# Patient Record
Sex: FEMALE | Race: BLACK OR AFRICAN AMERICAN | NOT HISPANIC OR LATINO | Employment: FULL TIME | URBAN - METROPOLITAN AREA
[De-identification: names, ages, dates, MRNs, and addresses within clinical notes are randomized per-mention and may not be internally consistent; named-entity substitution may affect disease eponyms.]

---

## 2017-10-31 ENCOUNTER — INITIAL PRENATAL (OUTPATIENT)
Dept: OBSTETRICS AND GYNECOLOGY | Facility: CLINIC | Age: 32
End: 2017-10-31

## 2017-10-31 VITALS
HEIGHT: 61 IN | DIASTOLIC BLOOD PRESSURE: 70 MMHG | SYSTOLIC BLOOD PRESSURE: 101 MMHG | WEIGHT: 152 LBS | BODY MASS INDEX: 28.7 KG/M2

## 2017-10-31 DIAGNOSIS — N92.6 MISSED MENSES: Primary | ICD-10-CM

## 2017-10-31 DIAGNOSIS — Z11.4 SCREENING FOR HIV (HUMAN IMMUNODEFICIENCY VIRUS): ICD-10-CM

## 2017-10-31 DIAGNOSIS — Z34.80 NORMAL PREGNANCY IN MULTIGRAVIDA: ICD-10-CM

## 2017-10-31 DIAGNOSIS — D75.839 THROMBOCYTOSIS: ICD-10-CM

## 2017-10-31 DIAGNOSIS — Z64.1 GRAND MULTIPARA: ICD-10-CM

## 2017-10-31 DIAGNOSIS — Z02.83 ENCOUNTER FOR DRUG SCREENING: ICD-10-CM

## 2017-10-31 DIAGNOSIS — O99.011 ANEMIA COMPLICATING PREGNANCY IN FIRST TRIMESTER: ICD-10-CM

## 2017-10-31 DIAGNOSIS — Z11.3 SCREEN FOR STD (SEXUALLY TRANSMITTED DISEASE): ICD-10-CM

## 2017-10-31 DIAGNOSIS — Z12.4 SCREENING FOR CERVICAL CANCER: ICD-10-CM

## 2017-10-31 DIAGNOSIS — O21.9 NAUSEA/VOMITING IN PREGNANCY: ICD-10-CM

## 2017-10-31 LAB
B-HCG UR QL: POSITIVE
INTERNAL NEGATIVE CONTROL: NEGATIVE
INTERNAL POSITIVE CONTROL: POSITIVE
Lab: ABNORMAL

## 2017-10-31 PROCEDURE — 81025 URINE PREGNANCY TEST: CPT | Performed by: OBSTETRICS & GYNECOLOGY

## 2017-10-31 PROCEDURE — 99203 OFFICE O/P NEW LOW 30 MIN: CPT | Performed by: OBSTETRICS & GYNECOLOGY

## 2017-10-31 RX ORDER — CALCIUM CITRATE, IRON PENTACARBONYL, CHOLECALCIFEROL, .ALPHA.-TOCOPHEROL, DL-, PYRIDOXINE HYDROCHLORIDE, FOLIC ACID, DOCUSATE SODIUM, AND DOCONEXENT 104; 27; 400; 30; 25; 1; 50; 260 MG/1; MG/1; [IU]/1; [IU]/1; MG/1; MG/1; MG/1; MG/1
1 CAPSULE, GELATIN COATED ORAL DAILY
Qty: 30 CAPSULE | Refills: 11 | Status: SHIPPED | OUTPATIENT
Start: 2017-10-31 | End: 2018-03-06

## 2017-10-31 RX ORDER — DIPHENHYDRAMINE HYDROCHLORIDE 25 MG/1
25 CAPSULE ORAL 3 TIMES DAILY
Qty: 90 TABLET | Refills: 2 | Status: SHIPPED | OUTPATIENT
Start: 2017-10-31 | End: 2017-12-12

## 2017-10-31 NOTE — PROGRESS NOTES
Chief complaint: Pregnancy, nausea and vomiting  History of present illness: Patient is here for her initial prenatal visit today.  Her only complaint is with nausea and vomiting every day.  She has not taken any medicine at this time.  She reports that she has had nausea for about 2 weeks, but it has worsened over the last 3 days.  She is not taking a prenatal vitamin.  She denies pelvic pain or abdominal pain.  She denies vaginal bleeding or discharge.  She has not been seen anywhere for this pregnancy.  She had a positive pregnancy test about 2-3 weeks ago.  Last menstrual period was 17 and within normal.  The patient reports that she would like to have permanent sterilization at the completion of this pregnancy.  Patient does report a history of a blood transfusion during her last pregnancy.  She reports that she saw hematology and no identifying course of her anemia was noted.  Her workup for sickle cell trait and thalassemia was negative, per the patient.    OB History    Para Term  AB Living   9 4 2 2 4 4   SAB TAB Ectopic Multiple Live Births   4          # Outcome Date GA Lbr Jose/2nd Weight Sex Delivery Anes PTL Lv   9 Current            8 Term  40w1d  8 lb 11 oz (3.941 kg) F Vag-Spont      7 Term  39w0d  7 lb 7 oz (3.374 kg) M Vag-Spont      6 SAB            5 SAB            4 SAB            3 SAB            2   35w0d  5 lb 9 oz (2.523 kg) F Vag-Spont      1   36w0d  7 lb 15 oz (3.6 kg) F Vag-Spont             Past Medical History:   Diagnosis Date   • Anemia    • Chlamydia    • History of blood product transfusion     with last pregnancy - saw hematology; w/u negative   • Tuberculosis     Previously exposed, but TB test negative   • Urinary tract infection      Past Surgical History:   Procedure Laterality Date   • HAND SURGERY       Social History   Substance Use Topics   • Smoking status: Former Smoker   • Smokeless tobacco: Never Used   • Alcohol use No  "    Family History   Problem Relation Age of Onset   • Colon cancer Mother    • Cervical cancer Mother    • Hypertension Paternal Grandmother    • Colon cancer Maternal Grandmother      Meds: None    Allergies   Allergen Reactions   • Penicillins      ROS:   General: No fever or chills  Constitutional: No weight loss or gain, no hair loss  HENT: No headache, no hearing loss, no tinnitus  Eyes: normal vision, no eye pain  Lungs: No cough, no shortness of breath  Heart: No chest pain, no palpitations  Abdomen: Pos nausea, vomiting, No constipation or diarrhea  : No dysuria, no hematuria  Skin: No rashes  Lymph: No swelling  Neuro: No parathesia, no weakness  Psych: Normal though content, no hallucinations, no SI/HI    PE:  Vitals:    10/31/17 1133 10/31/17 1134   BP: 101/70    Weight: 152 lb (68.9 kg)    Height:  61\" (154.9 cm)   See prenatal physical in flowsheet      Assessment:  1.  32-year-old  9 para 2-44 at 10-2/7 weeks gestational age by last motion.  2.  Nausea and vomiting of pregnancy  3.  Grand multiparity  4.  History of anemia and blood transfusion  5.  History of late  birth, followed by a term birth ×2  6.  History of recurrent miscarriage  Plan:  1.  Initial prenatal counseling performed the patient today.  Pap smear and gonorrhea and chlamydia testing today.  Routine prenatal blood work today.  We discussed the hospital cross coverage system as well as routine prenatal counseling.  2.  We discussed issues with the patient's previous  births.  She had a 35 and 36 week  birth.  36 week  birth is somewhat spurious, given that this baby weighed 7 lbs. 15 oz. even so, she has had 2 full-term deliveries since that time.  We discussed potential recommendations for treatment with weekly progesterone injections.  The risks, benefits, and alternatives were discussed.  Patient declines weekly progesterone injections, and I feel this is reasonable given her history.  3.  " Regarding nausea and vomiting, we discussed dietary and last modifications.  We will start vitamin B6 and doxylamine.  4.  Start a daily prenatal vitamin.  5.  We discussed patient's desires permanent contraception.  Risks, benefits, and alternatives were discussed, including the risk of regret and sterilization failure.  Patient still wishes to proceed with permanent sterilization.  We will continue to address this throughout her pregnancy, but I believe that sterilization should be reasonable for this patient.  Ultrasound next well for dating.  Return to the office in 4 weeks for OB follow-up.    I spent 20 out of 30 minutes with the patient in face to face counseling of the above issues.

## 2017-11-01 ENCOUNTER — PROCEDURE VISIT (OUTPATIENT)
Dept: OBSTETRICS AND GYNECOLOGY | Facility: CLINIC | Age: 32
End: 2017-11-01

## 2017-11-01 DIAGNOSIS — Z34.91 PREGNANCY WITH UNCERTAIN DATES IN FIRST TRIMESTER: Primary | ICD-10-CM

## 2017-11-01 PROBLEM — O99.011 ANEMIA COMPLICATING PREGNANCY IN FIRST TRIMESTER: Status: ACTIVE | Noted: 2017-11-01

## 2017-11-01 PROBLEM — D75.839 THROMBOCYTOSIS: Status: ACTIVE | Noted: 2017-11-01

## 2017-11-01 LAB
ABO GROUP BLD: (no result)
BASOPHILS # BLD AUTO: 0 X10E3/UL (ref 0–0.2)
BASOPHILS NFR BLD AUTO: 0 %
BLD GP AB SCN SERPL QL: NEGATIVE
EOSINOPHIL # BLD AUTO: 0 X10E3/UL (ref 0–0.4)
EOSINOPHIL NFR BLD AUTO: 1 %
ERYTHROCYTE [DISTWIDTH] IN BLOOD BY AUTOMATED COUNT: 25.6 % (ref 12.3–15.4)
HBV SURFACE AG SERPL QL IA: NEGATIVE
HCT VFR BLD AUTO: 27.6 % (ref 34–46.6)
HCV AB S/CO SERPL IA: <0.1 S/CO RATIO (ref 0–0.9)
HGB BLD-MCNC: 8.6 G/DL (ref 11.1–15.9)
HIV 1+2 AB+HIV1 P24 AG SERPL QL IA: NON REACTIVE
IMM GRANULOCYTES # BLD: 0 X10E3/UL (ref 0–0.1)
IMM GRANULOCYTES NFR BLD: 0 %
LYMPHOCYTES # BLD AUTO: 1.1 X10E3/UL (ref 0.7–3.1)
LYMPHOCYTES NFR BLD AUTO: 18 %
MCH RBC QN AUTO: 22 PG (ref 26.6–33)
MCHC RBC AUTO-ENTMCNC: 31.2 G/DL (ref 31.5–35.7)
MCV RBC AUTO: 71 FL (ref 79–97)
MONOCYTES # BLD AUTO: 0.3 X10E3/UL (ref 0.1–0.9)
MONOCYTES NFR BLD AUTO: 5 %
MORPHOLOGY BLD-IMP: (no result)
NEUTROPHILS # BLD AUTO: 4.8 X10E3/UL (ref 1.4–7)
NEUTROPHILS NFR BLD AUTO: 76 %
PLATELET # BLD AUTO: 533 X10E3/UL (ref 150–379)
RBC # BLD AUTO: 3.91 X10E6/UL (ref 3.77–5.28)
RH BLD: POSITIVE
RPR SER QL: NON REACTIVE
RUBV IGG SERPL IA-ACNC: 6.1 INDEX
WBC # BLD AUTO: 6.3 X10E3/UL (ref 3.4–10.8)

## 2017-11-01 PROCEDURE — 76817 TRANSVAGINAL US OBSTETRIC: CPT | Performed by: OBSTETRICS & GYNECOLOGY

## 2017-11-01 RX ORDER — IRON, FOLIC ACID, CYANOCOBALAMIN, ASCORBIC ACID, AND DOCUSATE SODIUM 90; 1; 12; 120; 50 MG/1; MG/1; UG/1; MG/1; MG/1
1 TABLET, FILM COATED ORAL DAILY
Qty: 30 EACH | Refills: 11 | Status: SHIPPED | OUTPATIENT
Start: 2017-11-01 | End: 2017-12-12

## 2017-11-02 ENCOUNTER — TELEPHONE (OUTPATIENT)
Dept: OBSTETRICS AND GYNECOLOGY | Facility: CLINIC | Age: 32
End: 2017-11-02

## 2017-11-02 DIAGNOSIS — O21.9 NAUSEA/VOMITING IN PREGNANCY: Primary | ICD-10-CM

## 2017-11-02 LAB
AMPHETAMINES SERPL QL SCN: NEGATIVE NG/ML
BACTERIA UR CULT: NORMAL
BACTERIA UR CULT: NORMAL
BARBITURATES SERPL QL SCN: NEGATIVE UG/ML
BENZODIAZ SERPL QL SCN: NEGATIVE NG/ML
C TRACH RRNA SPEC QL NAA+PROBE: NEGATIVE
CANNABINOIDS SERPL QL SCN: NEGATIVE NG/ML
COCAINE+BZE SERPL QL SCN: NEGATIVE NG/ML
CYTOLOGIST CVX/VAG CYTO: NORMAL
CYTOLOGY CVX/VAG DOC THIN PREP: NORMAL
DX ICD CODE: NORMAL
DX ICD CODE: NORMAL
HIV 1 & 2 AB SER-IMP: NORMAL
HPV I/H RISK 4 DNA CVX QL PROBE+SIG AMP: NEGATIVE
METHADONE SERPL QL SCN: NEGATIVE NG/ML
N GONORRHOEA RRNA SPEC QL NAA+PROBE: NEGATIVE
OPIATES SERPL QL SCN: NEGATIVE NG/ML
OTHER STN SPEC: NORMAL
OXYCODONE+OXYMORPHONE SERPLBLD QL SCN: NEGATIVE NG/ML
PATH REPORT.FINAL DX SPEC: NORMAL
PCP SERPL QL SCN: NEGATIVE NG/ML
PROPOXYPH SERPL QL SCN: NEGATIVE NG/ML
STAT OF ADQ CVX/VAG CYTO-IMP: NORMAL
T VAGINALIS RRNA SPEC QL NAA+PROBE: NEGATIVE

## 2017-11-02 RX ORDER — PROMETHAZINE HYDROCHLORIDE 25 MG/1
25 TABLET ORAL EVERY 6 HOURS PRN
Qty: 30 TABLET | Refills: 2 | Status: SHIPPED | OUTPATIENT
Start: 2017-11-02 | End: 2018-01-03

## 2017-11-02 NOTE — TELEPHONE ENCOUNTER
----- Message from Lisa Mcghee sent at 11/1/2017  4:35 PM EDT -----  Contact: Patient   Patient called stating the Unisom you prescribed is not helping with her nausea. She stated that she cannot eat or drink. She was wondering if you could send something else in for her.    Call back # [phone]

## 2017-11-02 NOTE — TELEPHONE ENCOUNTER
She needs to also be taking the vitamin B6 3 times a day to help with the nausea.  If those 2 things are not working, I will send in a prescription for promethazine, which she may also take for nausea and vomiting.  It may cause drowsiness.

## 2017-11-06 ENCOUNTER — TELEPHONE (OUTPATIENT)
Dept: OBSTETRICS AND GYNECOLOGY | Facility: CLINIC | Age: 32
End: 2017-11-06

## 2017-11-06 NOTE — TELEPHONE ENCOUNTER
----- Message from Torito Sanford MD sent at 11/1/2017  1:17 PM EDT -----  Notify the patient that her blood count shows that she is pretty anemic.  Given her history of needing a blood transfusion last pregnancy, I am somewhat concerned by this.  She is not stage IV she needs a blood transfusion now.  I would like to start her on iron pills, so I will send these into the pharmacy for her.  Her platelet count was also somewhat elevated.  I think it will be a good idea for her to see a hematologist again this pregnancy for evaluation of these issues. We will refer her to hematology

## 2017-11-08 ENCOUNTER — TELEPHONE (OUTPATIENT)
Dept: OBSTETRICS AND GYNECOLOGY | Facility: CLINIC | Age: 32
End: 2017-11-08

## 2017-11-22 ENCOUNTER — TELEPHONE (OUTPATIENT)
Dept: OBSTETRICS AND GYNECOLOGY | Facility: CLINIC | Age: 32
End: 2017-11-22

## 2017-11-22 ENCOUNTER — APPOINTMENT (OUTPATIENT)
Dept: ONCOLOGY | Facility: CLINIC | Age: 32
End: 2017-11-22

## 2017-11-22 ENCOUNTER — APPOINTMENT (OUTPATIENT)
Dept: OTHER | Facility: HOSPITAL | Age: 32
End: 2017-11-22

## 2017-11-22 NOTE — TELEPHONE ENCOUNTER
PT called to cancel her appt with the hematology office. She stated that she was never informed that her appt was with the hematology office and that she thought that her appt was in our office. She will be calling their office to cancel and possibly reschedule.

## 2017-11-28 ENCOUNTER — ROUTINE PRENATAL (OUTPATIENT)
Dept: OBSTETRICS AND GYNECOLOGY | Facility: CLINIC | Age: 32
End: 2017-11-28

## 2017-11-28 VITALS — SYSTOLIC BLOOD PRESSURE: 113 MMHG | BODY MASS INDEX: 31.37 KG/M2 | DIASTOLIC BLOOD PRESSURE: 73 MMHG | WEIGHT: 166 LBS

## 2017-11-28 DIAGNOSIS — O99.011 ANEMIA COMPLICATING PREGNANCY IN FIRST TRIMESTER: Primary | ICD-10-CM

## 2017-11-28 DIAGNOSIS — Z34.80 NORMAL PREGNANCY IN MULTIGRAVIDA: ICD-10-CM

## 2017-11-28 DIAGNOSIS — Z64.1 GRAND MULTIPARA: ICD-10-CM

## 2017-11-28 PROBLEM — Z02.83 ENCOUNTER FOR DRUG SCREENING: Status: RESOLVED | Noted: 2017-10-31 | Resolved: 2017-11-28

## 2017-11-28 PROBLEM — Z11.3 SCREEN FOR STD (SEXUALLY TRANSMITTED DISEASE): Status: RESOLVED | Noted: 2017-10-31 | Resolved: 2017-11-28

## 2017-11-28 PROBLEM — Z12.4 SCREENING FOR CERVICAL CANCER: Status: RESOLVED | Noted: 2017-10-31 | Resolved: 2017-11-28

## 2017-11-28 PROBLEM — Z11.4 SCREENING FOR HIV (HUMAN IMMUNODEFICIENCY VIRUS): Status: RESOLVED | Noted: 2017-10-31 | Resolved: 2017-11-28

## 2017-11-28 PROCEDURE — 99213 OFFICE O/P EST LOW 20 MIN: CPT | Performed by: OBSTETRICS & GYNECOLOGY

## 2017-11-28 NOTE — PROGRESS NOTES
Chief complaint: Pregnancy, nausea and vomiting, improving  History present illness: Patient is here for her routine prenatal visit.  She has no major complaints today.  She does notice some improvement in her nausea and vomiting.  She has been trying to taper down some on the vitamin B6 and the promethazine.  She is still taking the mostly every day.  She has not started her iron supplement, because she was having difficulty with the insurance company.  She reports that she feels this will be corrected by the beginning of next month.  She is taking her prenatal vitamin.  Otherwise she is doing well today.  Denies vaginal bleeding or pelvic pain.  Objective: See vital signs in flowsheet  Gen.: No acute distress, awake and oriented ×3  Abdomen: Soft, nontender, fetal heart tones 165  Extremities: No edema, no tenderness  Labs: Prenatal labs reviewed with the patient.  Flowsheet updated.  Ultrasound: Ultrasound findings reviewed.  Estimated due date by ultrasound is 18.  This was not consistent with dates.  Revised due date noted.  Assessment:  1.  32-year-old  9 para 2244 at 13-0/7 weeks gestational age  2.  Nausea and vomiting of pregnancy, improving  3.  Severe anemia of pregnancy, patient with history of blood transfusion in her last pregnancy  4.  Grand multiparity  5.  History of late  birth, followed by term birth ×2  Plan:  1.  Ultrasound and lab findings discussed with the patient at length.  Discussed the need for iron supplementation.  The patient believes that she will be able to get her prescription filled next month.  In the meantime, I have sent her home with 15 day supply of samples of Ferralet.  We discussed the importance of taking iron supplementation.  She is also scheduled to see a hematologist on 17.  She is aware of this appointment.  2.  Patient advised to try to begin decreasing her antinausea medications.  She verbalizes understanding.  3.  Return to the office in 5  weeks for OB follow-up.  Plan ultrasound for anatomy in 5 weeks.  I spent 12 out of 15 minutes with the patient in face to face counseling of the above issues.

## 2017-12-12 ENCOUNTER — LAB (OUTPATIENT)
Dept: LAB | Facility: HOSPITAL | Age: 32
End: 2017-12-12

## 2017-12-12 ENCOUNTER — CONSULT (OUTPATIENT)
Dept: ONCOLOGY | Facility: CLINIC | Age: 32
End: 2017-12-12

## 2017-12-12 VITALS
SYSTOLIC BLOOD PRESSURE: 100 MMHG | WEIGHT: 163.8 LBS | DIASTOLIC BLOOD PRESSURE: 68 MMHG | HEART RATE: 97 BPM | BODY MASS INDEX: 29.02 KG/M2 | RESPIRATION RATE: 16 BRPM | TEMPERATURE: 98.2 F | HEIGHT: 63 IN | OXYGEN SATURATION: 100 %

## 2017-12-12 DIAGNOSIS — D75.839 THROMBOCYTOSIS: Primary | ICD-10-CM

## 2017-12-12 DIAGNOSIS — O99.011 ANEMIA COMPLICATING PREGNANCY IN FIRST TRIMESTER: Primary | ICD-10-CM

## 2017-12-12 LAB
BASOPHILS # BLD AUTO: 0.04 10*3/MM3 (ref 0–0.1)
BASOPHILS NFR BLD AUTO: 0.7 % (ref 0–1.1)
DEPRECATED RDW RBC AUTO: 54 FL (ref 37–49)
EOSINOPHIL # BLD AUTO: 0.09 10*3/MM3 (ref 0–0.36)
EOSINOPHIL NFR BLD AUTO: 1.5 % (ref 1–5)
ERYTHROCYTE [DISTWIDTH] IN BLOOD BY AUTOMATED COUNT: 20.8 % (ref 11.7–14.5)
FERRITIN SERPL-MCNC: 8.6 NG/ML (ref 11–207)
HCT VFR BLD AUTO: 25.4 % (ref 34–45)
HGB BLD-MCNC: 7.7 G/DL (ref 11.5–14.9)
HGB RETIC QN: 21.2 PG (ref 29.8–36.1)
IMM GRANULOCYTES # BLD: 0.08 10*3/MM3 (ref 0–0.03)
IMM GRANULOCYTES NFR BLD: 1.4 % (ref 0–0.5)
IMM RETICS NFR: 28.9 % (ref 3–15.8)
IRON 24H UR-MRATE: 24 MCG/DL (ref 37–145)
IRON SATN MFR SERPL: 4 % (ref 14–48)
LYMPHOCYTES # BLD AUTO: 1.15 10*3/MM3 (ref 1–3.5)
LYMPHOCYTES NFR BLD AUTO: 19.6 % (ref 20–49)
MCH RBC QN AUTO: 22.6 PG (ref 27–33)
MCHC RBC AUTO-ENTMCNC: 30.3 G/DL (ref 32–35)
MCV RBC AUTO: 74.7 FL (ref 83–97)
MONOCYTES # BLD AUTO: 0.6 10*3/MM3 (ref 0.25–0.8)
MONOCYTES NFR BLD AUTO: 10.2 % (ref 4–12)
NEUTROPHILS # BLD AUTO: 3.9 10*3/MM3 (ref 1.5–7)
NEUTROPHILS NFR BLD AUTO: 66.6 % (ref 39–75)
NRBC BLD MANUAL-RTO: 0 /100 WBC (ref 0–0)
PLATELET # BLD AUTO: 250 10*3/MM3 (ref 150–375)
RBC # BLD AUTO: 3.4 10*6/MM3 (ref 3.9–5)
RETICS/RBC NFR AUTO: 2.36 % (ref 0.6–2)
TIBC SERPL-MCNC: 535 MCG/DL (ref 249–505)
TRANSFERRIN SERPL-MCNC: 382 MG/DL (ref 200–360)
VIT B12 BLD-MCNC: 470 PG/ML (ref 211–946)
WBC NRBC COR # BLD: 5.86 10*3/MM3 (ref 4–10)

## 2017-12-12 PROCEDURE — 85046 RETICYTE/HGB CONCENTRATE: CPT | Performed by: INTERNAL MEDICINE

## 2017-12-12 PROCEDURE — 84466 ASSAY OF TRANSFERRIN: CPT | Performed by: INTERNAL MEDICINE

## 2017-12-12 PROCEDURE — 36415 COLL VENOUS BLD VENIPUNCTURE: CPT | Performed by: INTERNAL MEDICINE

## 2017-12-12 PROCEDURE — 82728 ASSAY OF FERRITIN: CPT | Performed by: INTERNAL MEDICINE

## 2017-12-12 PROCEDURE — 36416 COLLJ CAPILLARY BLOOD SPEC: CPT | Performed by: INTERNAL MEDICINE

## 2017-12-12 PROCEDURE — 99244 OFF/OP CNSLTJ NEW/EST MOD 40: CPT | Performed by: INTERNAL MEDICINE

## 2017-12-12 PROCEDURE — 82607 VITAMIN B-12: CPT | Performed by: INTERNAL MEDICINE

## 2017-12-12 PROCEDURE — 85025 COMPLETE CBC W/AUTO DIFF WBC: CPT | Performed by: INTERNAL MEDICINE

## 2017-12-12 PROCEDURE — 83540 ASSAY OF IRON: CPT | Performed by: INTERNAL MEDICINE

## 2017-12-12 RX ORDER — FERROUS SULFATE 325(65) MG
325 TABLET ORAL
Qty: 90 TABLET | Refills: 3 | Status: SHIPPED | OUTPATIENT
Start: 2017-12-12 | End: 2018-03-06

## 2017-12-12 NOTE — PROGRESS NOTES
REFERRING PROVIDER:    Alessandro Sanford MD  [address] [facility]  [place] [address] [place] [id number]    REASON FOR CONSULTATION:    1.  Iron deficiency anemia  2.  Intrauterine pregnancy  3.  Thrombocytosis    HISTORY OF PRESENT ILLNESS:  Stewart Emery is a 32 y.o. female who is referred today for further evaluation of severe microcytic anemia and thrombocytosis.    She is approximately 15 weeks pregnant.  Labs on 10/31/2017 showed hemoglobin of 8.6 with hematocrit 27.6%.  Platelets were high at 533.  The MCV was low at 71.    She has a long history of severe microcytic anemia.  She has required red blood cell transfusions in the past.  She had a normal hemoglobin electrophoresis in the past.    When she does menstruate she has heavy menstrual periods.  Again, she is currently approximately 15 weeks pregnant.  She denies any bleeding at this time.  She is on a very expensive iron supplement which she has run out of and she was unable to get it due to the expense.    She does complain of some dyspnea on exertion and fatigue but otherwise is doing well.        Past Medical History:   Diagnosis Date   • Anemia    • Chlamydia    • Gonorrhea 2010   • H/O heart disorder    • H/O Heart murmur    • History of blood product transfusion 2016    with last pregnancy - saw hematology; w/u negative   • History of miscarriage     x3   • Tuberculosis     Previously exposed, but TB test negative   • Urinary tract infection        Past Surgical History:   Procedure Laterality Date   • EYE SURGERY Right 2003    stye removal   • HAND SURGERY      Hand ligament reconstruction   • WISDOM TOOTH EXTRACTION         SOCIAL HISTORY:   reports that she has quit smoking. She has never used smokeless tobacco. She reports that she does not drink alcohol or use illicit drugs.    FAMILY HISTORY:  family history includes Cervical cancer in her mother; Colon cancer in her maternal grandmother, maternal uncle, and mother; Hypertension in her  paternal grandmother; Miscarriages / Stillbirths in her sister; Multiple sclerosis in her paternal grandmother; Sickle cell anemia in her maternal aunt; Thalassemia in her other. There is no history of Breast cancer or Ovarian cancer.    ALLERGIES:  Allergies   Allergen Reactions   • Darvon  [Propoxyphene] Rash   • Penicillins Rash       MEDICATIONS:  The medication list has been reviewed with the patient by the medical assistant, and the list has been updated in the electronic medical record, which I reviewed.  Medication dosages and frequencies were confirmed to be accurate.    Review of Systems   Constitutional: Negative for appetite change, chills, fatigue, fever and unexpected weight change.   HENT: Negative for congestion, dental problem, facial swelling, hearing loss, mouth sores, nosebleeds, rhinorrhea, sore throat, tinnitus, trouble swallowing and voice change.    Eyes: Negative.    Respiratory: Positive for shortness of breath. Negative for cough, chest tightness, wheezing and stridor.    Cardiovascular: Negative for chest pain, palpitations and leg swelling.   Gastrointestinal: Negative for abdominal distention, abdominal pain, blood in stool, constipation, diarrhea, nausea and vomiting.   Endocrine: Negative.    Genitourinary: Negative for difficulty urinating, dysuria, flank pain, frequency, hematuria, menstrual problem, pelvic pain, vaginal bleeding and vaginal discharge.   Musculoskeletal: Negative for arthralgias, back pain, joint swelling, myalgias, neck pain and neck stiffness.   Skin: Negative for color change, rash and wound.   Allergic/Immunologic: Negative for environmental allergies.   Neurological: Negative for dizziness, seizures, syncope, weakness, numbness and headaches.   Hematological: Negative for adenopathy. Does not bruise/bleed easily.   Psychiatric/Behavioral: Negative for agitation, confusion and sleep disturbance. The patient is not nervous/anxious.    All other systems reviewed  "and are negative.      Vitals:    12/12/17 1509   BP: 100/68   Pulse: 97   Resp: 16   Temp: 98.2 °F (36.8 °C)   TempSrc: Oral   SpO2: 100%   Weight: 74.3 kg (163 lb 12.8 oz)   Height: 159 cm (62.6\")   PainSc: 0-No pain       Physical Exam   Constitutional: She is oriented to person, place, and time. She appears well-developed and well-nourished.   HENT:   Head: Normocephalic and atraumatic.   Nose: Nose normal.   Eyes: Conjunctivae and EOM are normal. Pupils are equal, round, and reactive to light.   Neck: Neck supple.   Cardiovascular: Normal rate, regular rhythm, S1 normal, S2 normal and normal heart sounds.  Exam reveals no gallop and no friction rub.    No murmur heard.  Pulmonary/Chest: Effort normal and breath sounds normal. No stridor. No respiratory distress. She has no wheezes. She has no rhonchi. She has no rales. She exhibits no tenderness.   Abdominal: Soft. Bowel sounds are normal. She exhibits no distension and no mass. There is no tenderness. There is no rebound and no guarding.   Musculoskeletal: Normal range of motion. She exhibits no edema.   Lymphadenopathy:     She has no cervical adenopathy.     She has no axillary adenopathy.        Right: No inguinal and no supraclavicular adenopathy present.        Left: No inguinal and no supraclavicular adenopathy present.   Neurological: She is alert and oriented to person, place, and time. No cranial nerve deficit or sensory deficit.   Skin: Skin is warm and dry. No rash noted. No erythema.   Psychiatric: She has a normal mood and affect. Her behavior is normal. Judgment and thought content normal.   Vitals reviewed.      DIAGNOSTIC DATA:  Lab Results   Component Value Date    WBC 5.86 12/12/2017    HGB 7.7 (C) 12/12/2017    HCT 25.4 (L) 12/12/2017    MCV 74.7 (L) 12/12/2017     12/12/2017       IMAGING:    I personally reviewed her peripheral blood smear.  Anisocytosis and poikilocytosis present with hypochromic microcytic red cells.  Platelets " are adequate in number and normal in morphology.  No significant white blood cell abnormalities.    ASSESSMENT:  This is a 32 y.o. female with:  1.  Thrombocytosis: Her platelet count today is actually normal.  2.  Severe microcytic anemia associated with pregnancy: It seems that she has likely been iron deficient for many years.  She is on an expensive oral iron supplement but cannot afford it.  I have recommended ferrous sulfate 325 mg 3 times daily.  We also discussed intravenous iron today.  I offered her Venofer but she prefers to start with the ferrous sulfate.  I advised her that it is critical that she take this 3 times daily and it is very important to let us know if she does not tolerate it.  She voices understanding.  I will see her back in a couple of months and if her iron deficiency has not improved significantly at that time I will recommend Venofer.      PLAN:   1.  Baseline labs today including reticulocyte count, ferritin, iron panel, vitamin B12 level, and RBC folate level  2.  I have electronically sent a prescription for ferrous sulfate 325 mg 3 times daily to her pharmacy.  I advised her of potential adverse effects.  I advised her to let us know if she is not tolerating it and at that point we will proceed with Venofer.  3.  I will see her back in about 2 months for follow-up.  If we have not seen significant improvement in her iron deficiency anemia at that time we will plan to proceed with Venofer.

## 2017-12-13 LAB
FOLATE BLD-MCNC: 484 NG/ML
FOLATE RBC-MCNC: 2008 NG/ML
HCT VFR BLD AUTO: 24.1 % (ref 34–46.6)

## 2018-01-01 ENCOUNTER — APPOINTMENT (OUTPATIENT)
Dept: GENERAL RADIOLOGY | Facility: HOSPITAL | Age: 33
End: 2018-01-01

## 2018-01-01 ENCOUNTER — HOSPITAL ENCOUNTER (EMERGENCY)
Facility: HOSPITAL | Age: 33
Discharge: HOME | End: 2018-01-01
Attending: EMERGENCY MEDICINE | Admitting: EMERGENCY MEDICINE

## 2018-01-01 VITALS
OXYGEN SATURATION: 99 % | TEMPERATURE: 97.7 F | HEIGHT: 61 IN | RESPIRATION RATE: 15 BRPM | SYSTOLIC BLOOD PRESSURE: 111 MMHG | HEART RATE: 81 BPM | DIASTOLIC BLOOD PRESSURE: 70 MMHG | WEIGHT: 170 LBS | BODY MASS INDEX: 32.1 KG/M2

## 2018-01-01 DIAGNOSIS — O99.011 ANEMIA COMPLICATING PREGNANCY IN FIRST TRIMESTER: ICD-10-CM

## 2018-01-01 DIAGNOSIS — R06.09 DYSPNEA ON EXERTION: Primary | ICD-10-CM

## 2018-01-01 LAB
ACANTHOCYTES BLD QL SMEAR: NORMAL
ALBUMIN SERPL-MCNC: 3.5 G/DL (ref 3.5–5.2)
ALBUMIN/GLOB SERPL: 0.9 G/DL
ALP SERPL-CCNC: 79 U/L (ref 39–117)
ALT SERPL W P-5'-P-CCNC: 11 U/L (ref 1–33)
ANION GAP SERPL CALCULATED.3IONS-SCNC: 16.3 MMOL/L
ANISOCYTOSIS BLD QL: NORMAL
AST SERPL-CCNC: 17 U/L (ref 1–32)
BASOPHILS # BLD AUTO: 0.02 10*3/MM3 (ref 0–0.2)
BASOPHILS NFR BLD AUTO: 0.3 % (ref 0–1.5)
BILIRUB SERPL-MCNC: 0.2 MG/DL (ref 0.1–1.2)
BUN BLD-MCNC: 6 MG/DL (ref 6–20)
BUN/CREAT SERPL: 11.3 (ref 7–25)
C3 FRG RBC-MCNC: NORMAL
CALCIUM SPEC-SCNC: 8.8 MG/DL (ref 8.6–10.5)
CHLORIDE SERPL-SCNC: 99 MMOL/L (ref 98–107)
CO2 SERPL-SCNC: 17.7 MMOL/L (ref 22–29)
CREAT BLD-MCNC: 0.53 MG/DL (ref 0.57–1)
DACRYOCYTES BLD QL SMEAR: NORMAL
DEPRECATED RDW RBC AUTO: 54.6 FL (ref 37–54)
EOSINOPHIL # BLD AUTO: 0.07 10*3/MM3 (ref 0–0.7)
EOSINOPHIL NFR BLD AUTO: 1 % (ref 0.3–6.2)
ERYTHROCYTE [DISTWIDTH] IN BLOOD BY AUTOMATED COUNT: 20.7 % (ref 11.7–13)
GFR SERPL CREATININE-BSD FRML MDRD: >150 ML/MIN/1.73
GLOBULIN UR ELPH-MCNC: 4 GM/DL
GLUCOSE BLD-MCNC: 99 MG/DL (ref 65–99)
HCT VFR BLD AUTO: 25 % (ref 35.6–45.5)
HGB BLD-MCNC: 7.2 G/DL (ref 11.9–15.5)
HOLD SPECIMEN: NORMAL
HOLD SPECIMEN: NORMAL
IMM GRANULOCYTES # BLD: 0.02 10*3/MM3 (ref 0–0.03)
IMM GRANULOCYTES NFR BLD: 0.3 % (ref 0–0.5)
LYMPHOCYTES # BLD AUTO: 1.63 10*3/MM3 (ref 0.9–4.8)
LYMPHOCYTES NFR BLD AUTO: 22.3 % (ref 19.6–45.3)
MCH RBC QN AUTO: 21.7 PG (ref 26.9–32)
MCHC RBC AUTO-ENTMCNC: 28.8 G/DL (ref 32.4–36.3)
MCV RBC AUTO: 75.3 FL (ref 80.5–98.2)
MONOCYTES # BLD AUTO: 0.48 10*3/MM3 (ref 0.2–1.2)
MONOCYTES NFR BLD AUTO: 6.6 % (ref 5–12)
NEUTROPHILS # BLD AUTO: 5.08 10*3/MM3 (ref 1.9–8.1)
NEUTROPHILS NFR BLD AUTO: 69.5 % (ref 42.7–76)
NRBC BLD MANUAL-RTO: 0.5 /100 WBC (ref 0–0)
OVALOCYTES BLD QL SMEAR: NORMAL
PLAT MORPH BLD: NORMAL
PLATELET # BLD AUTO: 239 10*3/MM3 (ref 140–500)
PMV BLD AUTO: ABNORMAL FL (ref 6–12)
POLYCHROMASIA BLD QL SMEAR: NORMAL
POTASSIUM BLD-SCNC: 3 MMOL/L (ref 3.5–5.2)
PROT SERPL-MCNC: 7.5 G/DL (ref 6–8.5)
RBC # BLD AUTO: 3.32 10*6/MM3 (ref 3.9–5.2)
SCHISTOCYTES BLD QL SMEAR: NORMAL
SODIUM BLD-SCNC: 133 MMOL/L (ref 136–145)
STOMATOCYTES BLD QL SMEAR: NORMAL
WBC MORPH BLD: NORMAL
WBC NRBC COR # BLD: 7.3 10*3/MM3 (ref 4.5–10.7)
WHOLE BLOOD HOLD SPECIMEN: NORMAL
WHOLE BLOOD HOLD SPECIMEN: NORMAL

## 2018-01-01 PROCEDURE — 99283 EMERGENCY DEPT VISIT LOW MDM: CPT

## 2018-01-01 PROCEDURE — 85025 COMPLETE CBC W/AUTO DIFF WBC: CPT | Performed by: EMERGENCY MEDICINE

## 2018-01-01 PROCEDURE — 80053 COMPREHEN METABOLIC PANEL: CPT | Performed by: EMERGENCY MEDICINE

## 2018-01-01 PROCEDURE — 85007 BL SMEAR W/DIFF WBC COUNT: CPT | Performed by: EMERGENCY MEDICINE

## 2018-01-01 PROCEDURE — 71046 X-RAY EXAM CHEST 2 VIEWS: CPT

## 2018-01-01 NOTE — ED NOTES
Pt reports episodes of SOB and dizziness after walking. Pt denies any symptoms at time of assessment. Pt is in NAD at this time. Breathing is even and unlabored. Vital signs stable. Reassurance given, call light in reach. Stretcher in low position.       Luana Kirkland RN  01/01/18 0655

## 2018-01-01 NOTE — DISCHARGE INSTRUCTIONS
It is very important that you take your iron pills 3 times a day as previously prescribed.  Follow-up with your OB doctor in 2 days as scheduled.  Follow-up with Dr. Faust as scheduled.  Return to emergency Department for persistent shortness of breath, fever, chest pain, or other concern.

## 2018-01-01 NOTE — ED PROVIDER NOTES
" EMERGENCY DEPARTMENT ENCOUNTER    CHIEF COMPLAINT  Chief Complaint: SOA  History given by: Pt  History limited by: Nothing  Room Number:   PMD: Alessandro Sanford MD  OBGYN: Dr. Remy    HPI:  Pt is a 32 y.o. female who presents complaining of SOA onset earlier today which lasted about two hours. She reports she went up and down the stairs a few times prior to experiencing her symptoms. She also states she experienced light-headedness at the time of her episode. She denies CP, or any other current symptoms. She reports she has been taking her iron supplement medications once a day opposed to three times daily as prescribed. Pt denies hx of pulmonary issues. Pt is 17 week pregnant. She is . She reports she has an appointment with her hematologist later this month.    Duration:  Earlier today  Onset: gradual  Timing: constant  Location: n/a  Radiation: none  Quality: \"SOA\"  Intensity/Severity: moderate  Progression: unchanged  Associated Symptoms: light-headedness (resolved)  Aggravating Factors: none  Alleviating Factors: none  Previous Episodes: none  Treatment before arrival: Pt received no treatment PTA.    PAST MEDICAL HISTORY  Active Ambulatory Problems     Diagnosis Date Noted   • Grand multipara 10/31/2017   • Normal pregnancy in multigravida 10/31/2017   • Nausea/vomiting in pregnancy 10/31/2017   • Anemia complicating pregnancy in first trimester 2017   • Thrombocytosis 2017     Resolved Ambulatory Problems     Diagnosis Date Noted   • Screen for STD (sexually transmitted disease) 10/31/2017   • Screening for cervical cancer 10/31/2017   • Screening for HIV (human immunodeficiency virus) 10/31/2017   • Encounter for drug screening 10/31/2017     Past Medical History:   Diagnosis Date   • Anemia    • Chlamydia    • Gonorrhea    • H/O heart disorder    • H/O Heart murmur    • History of blood product transfusion    • History of miscarriage    • Tuberculosis    • " Urinary tract infection        PAST SURGICAL HISTORY  Past Surgical History:   Procedure Laterality Date   • EYE SURGERY Right 2003    stye removal   • HAND SURGERY      Hand ligament reconstruction   • WISDOM TOOTH EXTRACTION         FAMILY HISTORY  Family History   Problem Relation Age of Onset   • Colon cancer Mother    • Cervical cancer Mother    • Hypertension Paternal Grandmother    • Multiple sclerosis Paternal Grandmother    • Colon cancer Maternal Grandmother    • Miscarriages / Stillbirths Sister    • Sickle cell anemia Maternal Aunt    • Colon cancer Maternal Uncle    • Thalassemia Other      niece   • Breast cancer Neg Hx    • Ovarian cancer Neg Hx        SOCIAL HISTORY  Social History     Social History   • Marital status: Single     Spouse name: N/A   • Number of children: 4   • Years of education: College     Occupational History   • 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   HENT: Negative for sore throat.    Eyes: Negative.    Respiratory: Positive for shortness of breath (resolved). Negative for cough.    Cardiovascular: Negative for chest pain.   Gastrointestinal: Negative for abdominal pain, diarrhea and vomiting.   Genitourinary: Negative for dysuria.   Musculoskeletal: Negative for neck pain.   Skin: Negative for rash.   Allergic/Immunologic: Negative.    Neurological: Positive for light-headedness (resolved). Negative for weakness, numbness and headaches.   Hematological: Negative.    Psychiatric/Behavioral: Negative.    All other systems reviewed and are  negative.      PHYSICAL EXAM  ED Triage Vitals   Temp Heart Rate Resp BP SpO2   01/01/18 0405 01/01/18 0359 01/01/18 0359 01/01/18 0405 01/01/18 0359   98 °F (36.7 °C) 120 22 121/74 100 %      Temp src Heart Rate Source Patient Position BP Location FiO2 (%)   01/01/18 0405 01/01/18 0359 01/01/18 0647 01/01/18 0647 --   Tympanic Monitor Sitting Right arm        Physical Exam   Constitutional: She is oriented to person, place, and time and well-developed, well-nourished, and in no distress. No distress.   HENT:   Head: Normocephalic and atraumatic.   Eyes: EOM are normal. Pupils are equal, round, and reactive to light.   Pt has pale conjunctiva.   Neck: Normal range of motion. Neck supple.   Cardiovascular: Normal rate, regular rhythm and normal heart sounds.    Pulmonary/Chest: Effort normal and breath sounds normal. No respiratory distress.   Abdominal: Soft. There is no tenderness. There is no rebound and no guarding.   Abd is gravid.   Musculoskeletal: Normal range of motion. She exhibits no edema (no pedal edema).   Neurological: She is alert and oriented to person, place, and time. She has normal sensation and normal strength.   Skin: Skin is warm and dry. No rash noted.   Psychiatric: Mood and affect normal.   Nursing note and vitals reviewed.      LAB RESULTS  Lab Results (last 24 hours)     Procedure Component Value Units Date/Time    CBC & Differential [[id number]] Collected:  01/01/18 0444    Specimen:  Blood Updated:  01/01/18 0534    Narrative:       The following orders were created for panel order CBC & Differential.  Procedure                               Abnormality         Status                     ---------                               -----------         ------                     Scan Slide[[id number]]                                       Final result               CBC Auto Differential[[id number]]        Abnormal            Final result                 Please view results for these tests on the  individual orders.    Comprehensive Metabolic Panel [[id number]]  (Abnormal) Collected:  01/01/18 0444    Specimen:  Blood Updated:  01/01/18 0519     Glucose 99 mg/dL      BUN 6 mg/dL      Creatinine 0.53 (L) mg/dL      Sodium 133 (L) mmol/L      Potassium 3.0 (L) mmol/L      Chloride 99 mmol/L      CO2 17.7 (L) mmol/L      Calcium 8.8 mg/dL      Total Protein 7.5 g/dL      Albumin 3.50 g/dL      ALT (SGPT) 11 U/L      AST (SGOT) 17 U/L      Alkaline Phosphatase 79 U/L      Total Bilirubin 0.2 mg/dL      eGFR  African Amer >150 mL/min/1.73      Globulin 4.0 gm/dL      A/G Ratio 0.9 g/dL      BUN/Creatinine Ratio 11.3     Anion Gap 16.3 mmol/L     CBC Auto Differential [[id number]]  (Abnormal) Collected:  01/01/18 0444    Specimen:  Blood Updated:  01/01/18 0534     WBC 7.30 10*3/mm3      RBC 3.32 (L) 10*6/mm3      Hemoglobin 7.2 (L) g/dL      Hematocrit 25.0 (L) %      MCV 75.3 (L) fL      MCH 21.7 (L) pg      MCHC 28.8 (L) g/dL      RDW 20.7 (H) %      RDW-SD 54.6 (H) fl      MPV -- fL       .        Platelets 239 10*3/mm3      Neutrophil % 69.5 %      Lymphocyte % 22.3 %      Monocyte % 6.6 %      Eosinophil % 1.0 %      Basophil % 0.3 %      Immature Grans % 0.3 %      Neutrophils, Absolute 5.08 10*3/mm3      Lymphocytes, Absolute 1.63 10*3/mm3      Monocytes, Absolute 0.48 10*3/mm3      Eosinophils, Absolute 0.07 10*3/mm3      Basophils, Absolute 0.02 10*3/mm3      Immature Grans, Absolute 0.02 10*3/mm3      nRBC 0.5 (H) /100 WBC     Scan Slide [[id number]] Collected:  01/01/18 0444    Specimen:  Blood Updated:  01/01/18 0534     Acanthocytes Slight/1+     Anisocytosis Large/3+     Dacrocytes Slight/1+     Ovalocytes Slight/1+     Polychromasia Slight/1+     RBC Fragments Slight/1+     Schistocytes Slight/1+     Stomatocytes Slight/1+     WBC Morphology Normal     Platelet Morphology Normal          I ordered the above labs and reviewed the results    RADIOLOGY  XR Chest 2 View   Preliminary Result   No acute  findings.                   I ordered the above noted radiological studies. Interpreted by radiologist. Reviewed by me in PACS.       PROCEDURES  Procedures      PROGRESS AND CONSULTS  ED Course   Value Comment By Time   Hemoglobin: (!) 7.2 7.7 two wks ago Rai Burt MD 01/01 0769 1589 Ordered CXR, CMP, and CXR for further evaluation    0729 Rechecked pt who is resting and in no acute distress. Informed pt of unremarkable lab and imaging results. Suggested pt take her iron supplements as prescribed. Suggested pt follow up with her hematologist, OBGYN, and her PCP. Will discharge. Pt understands and agrees with the plan. All questions answered,    MEDICAL DECISION MAKING  Results were reviewed/discussed with the patient and they were also made aware of online access. Pt also made aware that some labs, such as cultures, will not be resulted during ER visit and follow up with PMD is necessary.     MDM  Number of Diagnoses or Management Options  Anemia complicating pregnancy in first trimester:   Dyspnea on exertion (resolved):   Diagnosis management comments: Patient was asymptomatic in the ER.  She was not hypoxic.  Heart rate was initially mildly elevated but returned to normal without intervention.  Chest x-ray was normal.  Patient is chronically anemic.  She has not been taking her oral iron as prescribed.  She has an appointment with her obstetrician later this week.  Patient will be discharged.  She was advised to take her iron 3 times daily and to follow-up with her obstetrician as well as with her hematologist.       Amount and/or Complexity of Data Reviewed  Clinical lab tests: ordered and reviewed (WBC - 7.30, Hemoglobin - 7.2)  Tests in the radiology section of CPT®: reviewed and ordered (CXR shows NAD.)  Decide to obtain previous medical records or to obtain history from someone other than the patient: yes  Review and summarize past medical records: yes (Pt saw Dr. Faust (hematology) 12/12 for  iron deficiency anemia. Her hemoglobin was 7.7 at that time. Pt was started on an iron supplement. He offered to start her on IV iron but she declined at that time.)  Independent visualization of images, tracings, or specimens: yes    Patient Progress  Patient progress: stable         DIAGNOSIS  Final diagnoses:   Dyspnea on exertion (resolved)   Anemia complicating pregnancy in first trimester       DISPOSITION  DISCHARGE    Patient discharged in stable condition.    Reviewed implications of results, diagnosis, meds, responsibility to follow up, warning signs and symptoms of possible worsening, potential complications and reasons to return to ER, including new or worsening symptoms.    Patient/Family voiced understanding of above instructions.    Discussed plan for discharge, as there is no emergent indication for admission.  Pt/family is agreeable and understands need for follow up and repeat testing.  Pt is aware that discharge does not mean that nothing is wrong but it indicates no emergency is present that requires admission and they must continue care with follow-up as given below or physician of their choice.     FOLLOW-UP  Alessandro Sanford MD  [address] [address] [facility]  [address]  [place] [address]  [phone]    Go in 2 days  As scheduled    Shiraz Faust MD  [address]  [address]  [place] [address]  [phone]    Go in 1 month  As scheduled         Medication List      Notice     No changes were made to your prescriptions during this visit.            Latest Documented Vital Signs:  As of 7:30 AM  BP- 130/73 HR- 96 Temp- 98 °F (36.7 °C) (Tympanic) O2 sat- 100%    --  Documentation assistance provided by jesus manuel Sandoval for Dr. Burt.  Information recorded by the scrsoledade was done at my direction and has been verified and validated by me.     Enrique Sandoval  01/01/18 8960       Rai Burt MD  01/01/18 2255

## 2018-01-01 NOTE — ED NOTES
Pt reports she is 17 weeks pregnant and is c/o soa and dizziness and also reports she has iron deficiency anemia     Ted Paulino RN  01/01/18 2128

## 2018-01-02 ENCOUNTER — TELEPHONE (OUTPATIENT)
Dept: ONCOLOGY | Facility: HOSPITAL | Age: 33
End: 2018-01-02

## 2018-01-02 NOTE — TELEPHONE ENCOUNTER
----- Message from Shiraz Faust MD sent at 1/2/2018  8:50 AM EST -----  Regarding: please call her to follow up  She was in the ER yesterday with sob and he hgb dropped to 7.2 since we saw her.  She is supposed to be taking an oral iron supplement.  Please make sure she is taking it and tolerating it and let me know please.  Thanks,  [facility]       Attempted to contact patient.  No answer, left voicemail.  She has appt with OB tomorrow.  According to ER notes, she was instructed to take her iron TID.

## 2018-01-03 ENCOUNTER — TELEPHONE (OUTPATIENT)
Dept: ONCOLOGY | Facility: HOSPITAL | Age: 33
End: 2018-01-03

## 2018-01-03 ENCOUNTER — ROUTINE PRENATAL (OUTPATIENT)
Dept: OBSTETRICS AND GYNECOLOGY | Facility: CLINIC | Age: 33
End: 2018-01-03

## 2018-01-03 ENCOUNTER — PROCEDURE VISIT (OUTPATIENT)
Dept: OBSTETRICS AND GYNECOLOGY | Facility: CLINIC | Age: 33
End: 2018-01-03

## 2018-01-03 ENCOUNTER — TELEPHONE (OUTPATIENT)
Dept: ONCOLOGY | Facility: CLINIC | Age: 33
End: 2018-01-03

## 2018-01-03 VITALS — BODY MASS INDEX: 31.74 KG/M2 | DIASTOLIC BLOOD PRESSURE: 73 MMHG | WEIGHT: 168 LBS | SYSTOLIC BLOOD PRESSURE: 122 MMHG

## 2018-01-03 DIAGNOSIS — O35.BXX0 ECHOGENIC FOCUS OF HEART OF FETUS AFFECTING ANTEPARTUM CARE OF MOTHER, SINGLE OR UNSPECIFIED FETUS: ICD-10-CM

## 2018-01-03 DIAGNOSIS — Z34.80 NORMAL PREGNANCY IN MULTIGRAVIDA: ICD-10-CM

## 2018-01-03 DIAGNOSIS — Z36.89 SCREENING, ANTENATAL, FOR FETAL ANATOMIC SURVEY: Primary | ICD-10-CM

## 2018-01-03 DIAGNOSIS — Z36.0 ENCOUNTER FOR ANTENATAL SCREENING FOR CHROMOSOMAL ANOMALIES: ICD-10-CM

## 2018-01-03 DIAGNOSIS — O99.012 ANEMIA COMPLICATING PREGNANCY IN SECOND TRIMESTER: Primary | ICD-10-CM

## 2018-01-03 LAB — EXTERNAL NIPT: NORMAL

## 2018-01-03 PROCEDURE — 99213 OFFICE O/P EST LOW 20 MIN: CPT | Performed by: OBSTETRICS & GYNECOLOGY

## 2018-01-03 PROCEDURE — 76805 OB US >/= 14 WKS SNGL FETUS: CPT | Performed by: OBSTETRICS & GYNECOLOGY

## 2018-01-03 NOTE — PROGRESS NOTES
Chief complaint: Pregnancy follow-up  History of present illness: Patient is here for her routine prenatal visit.  She does have a history significant for severe anemia.  The patient went to the emergency room 2 days ago because of dizziness and shortness of breath.  Patient feels that she had overexerted herself.  Her hemoglobin in the emergency room was 7.2.  She had been taking iron, but admits that she was not taking it as frequently as 3 times a day as prescribed.  She has been taking her iron 3 times a day.  Since her last visit with me, the patient has seen hematology.  They offer the patient IV iron transfusions at that time, which she declined.  Patient reports that she is trying to call the hematology office today to discuss restarting IV iron transfusions.  She is otherwise doing well.  She reports that she has felt better since leaving the hospital 2 days ago.  She reports good fetal movement.  Objective: See vital signs in flowsheet  Gen.: No acute distress, awake and oriented ×3  Abdomen: Soft, nontender, fetal heart tones 147  Extremities: No edema, no tenderness  Assessment:  1.  32-year-old  9 para 4 at 18 and one sevenths weeks gestational age  2.  Severe anemia, currently asymptomatic, on oral iron  3.  Intracardiac echogenic focus  4.  Desires sterilization  Plan:  1.  Ultrasound findings today were discussed with the patient.  Limitations of ultrasound were explained.  Patient has one soft marker for aneuploidy (intracardiac focus).  Will for further screening options, and the patient would like to proceed with cell free DNA screening today.  2.  Patient does have severe anemia.  She is following closely with hematology.  She will likely start IV iron transfusions in the near future.  I will also have the patient get set up with maternal-fetal medicine for follow-up this pregnancy.  We discussed the risks of severe anemia of pregnancy including growth restriction, and possible need for  blood transfusions and indications for blood transfusions.  She verbalized understanding.  I've encouraged compliance with oral iron.  3.  Patient still remains adamant that she would like to have permanent sterilization.  We will sign her sterilization consents when she gets closer to the third trimester.  4.  Return to the office in 4 weeks.  I spent 12 out of 15 minutes with the patient in face to face counseling of the above issues.

## 2018-01-03 NOTE — TELEPHONE ENCOUNTER
----- Message from Shiraz Fasut MD sent at 1/3/2018  4:20 PM EST -----  venofer 200 mg iv weekly x 5 weeks    Cbc weekly    Iron studies, ferritin, cbc with her first dose    Thanks!    ----- Message -----     From: Nedra Tinsley RN     Sent: 1/3/2018   3:08 PM       To: Shiraz Faust MD    Patient asking if she could go ahead and do IV iron. Is tolerating the oral ok, but doesn't feel its doing anything. Went to ER over weekend due to extreme fatigue and SOB. hgb still in 7s. Please let us know what to set up for patient. Thanks!     Forwarded msg to appnt desk. Patient aware she would be getting iron.

## 2018-01-03 NOTE — TELEPHONE ENCOUNTER
Patient called with question about iron. I called her back, no answer. Left v/m for her to call us back.
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
## 2018-01-03 NOTE — TELEPHONE ENCOUNTER
----- Message from Nedra Tinsley RN sent at 1/3/2018  4:23 PM EST -----  Per  patient needs the following:    venofer 200 mg iv weekly x 5 weeks     Cbc weekly     Iron studies, ferritin, cbc with her first dose

## 2018-01-08 DIAGNOSIS — D50.0 IRON DEFICIENCY ANEMIA DUE TO CHRONIC BLOOD LOSS: ICD-10-CM

## 2018-01-08 DIAGNOSIS — O99.012 ANEMIA COMPLICATING PREGNANCY IN SECOND TRIMESTER: ICD-10-CM

## 2018-01-08 LAB
# FETUSES US: 1
CFDNA.FET/CFDNA.TOTAL SFR FETUS: 8.7 %
CHR X + Y ANEUP PLAS.CFDNA: NORMAL
CITATION REF LAB TEST: NORMAL
CYTOGENETICS STUDY: NORMAL
FET 13+18+21+X+Y ANEUP PLAS.CFDNA: NORMAL
FET CHR 13 TS PLAS.CFDNA QL: NORMAL
FET CHR 13 TS PLAS.CFDNA QL: NORMAL
FET CHR 18 TS PLAS.CFDNA QL: NORMAL
FET CHR 18 TS PLAS.CFDNA QL: NORMAL
FET CHR 21 TS PLAS.CFDNA QL: NORMAL
FET CHR 21 TS PLAS.CFDNA QL: NORMAL
FET CHROM X + Y ANEUP CFDNA IMP: NORMAL
GA: 13.4 WEEKS
GENETIC ALGORITHM SENSITIVITY: NORMAL %
LAB DIRECTOR NAME PROVIDER: NORMAL
Lab: NORMAL
REASON FOR REFERRAL (NARRATIVE): NORMAL
REF LAB TEST METHOD: NORMAL
SERVICE CMNT 02-IMP: NORMAL
SERVICE CMNT-IMP: NORMAL

## 2018-01-08 RX ORDER — DIPHENHYDRAMINE HCL 25 MG
25 CAPSULE ORAL ONCE
Status: CANCELLED | OUTPATIENT
Start: 2018-01-18

## 2018-01-08 RX ORDER — SODIUM CHLORIDE 9 MG/ML
250 INJECTION, SOLUTION INTRAVENOUS ONCE
OUTPATIENT
Start: 2018-03-12

## 2018-01-08 RX ORDER — ACETAMINOPHEN 325 MG/1
650 TABLET ORAL ONCE
OUTPATIENT
Start: 2018-03-12

## 2018-01-08 RX ORDER — SODIUM CHLORIDE 9 MG/ML
250 INJECTION, SOLUTION INTRAVENOUS ONCE
OUTPATIENT
Start: 2018-03-18

## 2018-01-08 RX ORDER — FAMOTIDINE 10 MG/ML
20 INJECTION, SOLUTION INTRAVENOUS ONCE
OUTPATIENT
Start: 2018-03-18

## 2018-01-08 RX ORDER — DIPHENHYDRAMINE HCL 25 MG
25 CAPSULE ORAL ONCE
OUTPATIENT
Start: 2018-03-12

## 2018-01-08 RX ORDER — FAMOTIDINE 10 MG/ML
20 INJECTION, SOLUTION INTRAVENOUS ONCE
Status: CANCELLED | OUTPATIENT
Start: 2018-01-18

## 2018-01-08 RX ORDER — FAMOTIDINE 10 MG/ML
20 INJECTION, SOLUTION INTRAVENOUS ONCE
Status: CANCELLED | OUTPATIENT
Start: 2018-01-11

## 2018-01-08 RX ORDER — DIPHENHYDRAMINE HCL 25 MG
25 CAPSULE ORAL ONCE
OUTPATIENT
Start: 2018-03-18

## 2018-01-08 RX ORDER — DIPHENHYDRAMINE HCL 25 MG
25 CAPSULE ORAL ONCE
Status: CANCELLED | OUTPATIENT
Start: 2018-01-11

## 2018-01-08 RX ORDER — ACETAMINOPHEN 325 MG/1
650 TABLET ORAL ONCE
OUTPATIENT
Start: 2018-03-18

## 2018-01-08 RX ORDER — FAMOTIDINE 10 MG/ML
20 INJECTION, SOLUTION INTRAVENOUS ONCE
OUTPATIENT
Start: 2018-03-05

## 2018-01-08 RX ORDER — ACETAMINOPHEN 325 MG/1
650 TABLET ORAL ONCE
Status: CANCELLED | OUTPATIENT
Start: 2018-01-18

## 2018-01-08 RX ORDER — SODIUM CHLORIDE 9 MG/ML
250 INJECTION, SOLUTION INTRAVENOUS ONCE
Status: CANCELLED | OUTPATIENT
Start: 2018-01-18

## 2018-01-08 RX ORDER — ACETAMINOPHEN 325 MG/1
650 TABLET ORAL ONCE
Status: CANCELLED | OUTPATIENT
Start: 2018-01-11

## 2018-01-08 RX ORDER — SODIUM CHLORIDE 9 MG/ML
250 INJECTION, SOLUTION INTRAVENOUS ONCE
OUTPATIENT
Start: 2018-03-05

## 2018-01-08 RX ORDER — ACETAMINOPHEN 325 MG/1
650 TABLET ORAL ONCE
OUTPATIENT
Start: 2018-03-05

## 2018-01-08 RX ORDER — SODIUM CHLORIDE 9 MG/ML
250 INJECTION, SOLUTION INTRAVENOUS ONCE
Status: CANCELLED | OUTPATIENT
Start: 2018-01-11

## 2018-01-08 RX ORDER — FAMOTIDINE 10 MG/ML
20 INJECTION, SOLUTION INTRAVENOUS ONCE
OUTPATIENT
Start: 2018-03-12

## 2018-01-08 RX ORDER — DIPHENHYDRAMINE HCL 25 MG
25 CAPSULE ORAL ONCE
OUTPATIENT
Start: 2018-03-05

## 2018-01-09 ENCOUNTER — TELEPHONE (OUTPATIENT)
Dept: OBSTETRICS AND GYNECOLOGY | Facility: CLINIC | Age: 33
End: 2018-01-09

## 2018-01-09 NOTE — TELEPHONE ENCOUNTER
----- Message from Alessandro Sanford MD sent at 1/9/2018 10:06 AM EST -----  Let the patient know that her chromosomal test was normal.

## 2018-01-11 ENCOUNTER — LAB (OUTPATIENT)
Dept: LAB | Facility: HOSPITAL | Age: 33
End: 2018-01-11

## 2018-01-11 ENCOUNTER — INFUSION (OUTPATIENT)
Dept: ONCOLOGY | Facility: HOSPITAL | Age: 33
End: 2018-01-11

## 2018-01-11 VITALS
DIASTOLIC BLOOD PRESSURE: 78 MMHG | TEMPERATURE: 98.4 F | SYSTOLIC BLOOD PRESSURE: 118 MMHG | BODY MASS INDEX: 32.69 KG/M2 | WEIGHT: 173 LBS | HEART RATE: 105 BPM

## 2018-01-11 DIAGNOSIS — O99.012 ANEMIA COMPLICATING PREGNANCY IN SECOND TRIMESTER: ICD-10-CM

## 2018-01-11 DIAGNOSIS — D50.0 IRON DEFICIENCY ANEMIA DUE TO CHRONIC BLOOD LOSS: Primary | ICD-10-CM

## 2018-01-11 DIAGNOSIS — D50.0 IRON DEFICIENCY ANEMIA DUE TO CHRONIC BLOOD LOSS: ICD-10-CM

## 2018-01-11 LAB
BASOPHILS # BLD AUTO: 0.04 10*3/MM3 (ref 0–0.1)
BASOPHILS NFR BLD AUTO: 0.6 % (ref 0–1.1)
DEPRECATED RDW RBC AUTO: 51.7 FL (ref 37–49)
EOSINOPHIL # BLD AUTO: 0.11 10*3/MM3 (ref 0–0.36)
EOSINOPHIL NFR BLD AUTO: 1.6 % (ref 1–5)
ERYTHROCYTE [DISTWIDTH] IN BLOOD BY AUTOMATED COUNT: 19.9 % (ref 11.7–14.5)
FERRITIN SERPL-MCNC: 6 NG/ML (ref 11–207)
HCT VFR BLD AUTO: 26.1 % (ref 34–45)
HGB BLD-MCNC: 7.7 G/DL (ref 11.5–14.9)
IMM GRANULOCYTES # BLD: 0.06 10*3/MM3 (ref 0–0.03)
IMM GRANULOCYTES NFR BLD: 0.9 % (ref 0–0.5)
IRON 24H UR-MRATE: 21 MCG/DL (ref 37–145)
IRON SATN MFR SERPL: 4 % (ref 14–48)
LYMPHOCYTES # BLD AUTO: 1.49 10*3/MM3 (ref 1–3.5)
LYMPHOCYTES NFR BLD AUTO: 21.9 % (ref 20–49)
MCH RBC QN AUTO: 22.4 PG (ref 27–33)
MCHC RBC AUTO-ENTMCNC: 29.5 G/DL (ref 32–35)
MCV RBC AUTO: 75.9 FL (ref 83–97)
MONOCYTES # BLD AUTO: 0.48 10*3/MM3 (ref 0.25–0.8)
MONOCYTES NFR BLD AUTO: 7 % (ref 4–12)
NEUTROPHILS # BLD AUTO: 4.63 10*3/MM3 (ref 1.5–7)
NEUTROPHILS NFR BLD AUTO: 68 % (ref 39–75)
NRBC BLD MANUAL-RTO: 0.4 /100 WBC (ref 0–0)
PLATELET # BLD AUTO: 277 10*3/MM3 (ref 150–375)
RBC # BLD AUTO: 3.44 10*6/MM3 (ref 3.9–5)
TIBC SERPL-MCNC: 567 MCG/DL (ref 249–505)
TRANSFERRIN SERPL-MCNC: 405 MG/DL (ref 200–360)
WBC NRBC COR # BLD: 6.81 10*3/MM3 (ref 4–10)

## 2018-01-11 PROCEDURE — 36416 COLLJ CAPILLARY BLOOD SPEC: CPT | Performed by: INTERNAL MEDICINE

## 2018-01-11 PROCEDURE — 63710000001 DIPHENHYDRAMINE PER 50 MG: Performed by: INTERNAL MEDICINE

## 2018-01-11 PROCEDURE — 84466 ASSAY OF TRANSFERRIN: CPT | Performed by: INTERNAL MEDICINE

## 2018-01-11 PROCEDURE — 36415 COLL VENOUS BLD VENIPUNCTURE: CPT | Performed by: INTERNAL MEDICINE

## 2018-01-11 PROCEDURE — 96365 THER/PROPH/DIAG IV INF INIT: CPT | Performed by: INTERNAL MEDICINE

## 2018-01-11 PROCEDURE — 96375 TX/PRO/DX INJ NEW DRUG ADDON: CPT | Performed by: INTERNAL MEDICINE

## 2018-01-11 PROCEDURE — 25010000002 IRON SUCROSE PER 1 MG: Performed by: INTERNAL MEDICINE

## 2018-01-11 PROCEDURE — 83540 ASSAY OF IRON: CPT | Performed by: INTERNAL MEDICINE

## 2018-01-11 PROCEDURE — 85025 COMPLETE CBC W/AUTO DIFF WBC: CPT | Performed by: INTERNAL MEDICINE

## 2018-01-11 PROCEDURE — 82728 ASSAY OF FERRITIN: CPT | Performed by: INTERNAL MEDICINE

## 2018-01-11 RX ORDER — SODIUM CHLORIDE 9 MG/ML
250 INJECTION, SOLUTION INTRAVENOUS ONCE
Status: COMPLETED | OUTPATIENT
Start: 2018-01-11 | End: 2018-01-11

## 2018-01-11 RX ORDER — FAMOTIDINE 10 MG/ML
20 INJECTION, SOLUTION INTRAVENOUS ONCE
Status: COMPLETED | OUTPATIENT
Start: 2018-01-11 | End: 2018-01-11

## 2018-01-11 RX ORDER — ACETAMINOPHEN 325 MG/1
650 TABLET ORAL ONCE
Status: COMPLETED | OUTPATIENT
Start: 2018-01-11 | End: 2018-01-11

## 2018-01-11 RX ORDER — DIPHENHYDRAMINE HCL 25 MG
25 CAPSULE ORAL ONCE
Status: COMPLETED | OUTPATIENT
Start: 2018-01-11 | End: 2018-01-11

## 2018-01-11 RX ADMIN — IRON SUCROSE 200 MG: 20 INJECTION, SOLUTION INTRAVENOUS at 09:00

## 2018-01-11 RX ADMIN — FAMOTIDINE 20 MG: 10 INJECTION, SOLUTION INTRAVENOUS at 08:32

## 2018-01-11 RX ADMIN — SODIUM CHLORIDE 250 ML: 900 INJECTION, SOLUTION INTRAVENOUS at 08:32

## 2018-01-11 RX ADMIN — ACETAMINOPHEN 650 MG: 325 TABLET ORAL at 08:32

## 2018-01-11 RX ADMIN — DIPHENHYDRAMINE HYDROCHLORIDE 25 MG: 25 CAPSULE ORAL at 08:32

## 2018-01-18 ENCOUNTER — LAB (OUTPATIENT)
Dept: LAB | Facility: HOSPITAL | Age: 33
End: 2018-01-18

## 2018-01-18 ENCOUNTER — INFUSION (OUTPATIENT)
Dept: ONCOLOGY | Facility: HOSPITAL | Age: 33
End: 2018-01-18

## 2018-01-18 VITALS
DIASTOLIC BLOOD PRESSURE: 89 MMHG | HEART RATE: 96 BPM | WEIGHT: 172.2 LBS | SYSTOLIC BLOOD PRESSURE: 120 MMHG | BODY MASS INDEX: 32.54 KG/M2

## 2018-01-18 DIAGNOSIS — O99.012 ANEMIA COMPLICATING PREGNANCY IN SECOND TRIMESTER: ICD-10-CM

## 2018-01-18 DIAGNOSIS — D50.0 IRON DEFICIENCY ANEMIA DUE TO CHRONIC BLOOD LOSS: Primary | ICD-10-CM

## 2018-01-18 DIAGNOSIS — D50.0 IRON DEFICIENCY ANEMIA DUE TO CHRONIC BLOOD LOSS: ICD-10-CM

## 2018-01-18 LAB
BASOPHILS # BLD AUTO: 0.02 10*3/MM3 (ref 0–0.1)
BASOPHILS NFR BLD AUTO: 0.2 % (ref 0–1.1)
DEPRECATED RDW RBC AUTO: 58.2 FL (ref 37–49)
EOSINOPHIL # BLD AUTO: 0.05 10*3/MM3 (ref 0–0.36)
EOSINOPHIL NFR BLD AUTO: 0.6 % (ref 1–5)
ERYTHROCYTE [DISTWIDTH] IN BLOOD BY AUTOMATED COUNT: 22.5 % (ref 11.7–14.5)
HCT VFR BLD AUTO: 29 % (ref 34–45)
HGB BLD-MCNC: 8.5 G/DL (ref 11.5–14.9)
IMM GRANULOCYTES # BLD: 0.05 10*3/MM3 (ref 0–0.03)
IMM GRANULOCYTES NFR BLD: 0.6 % (ref 0–0.5)
LYMPHOCYTES # BLD AUTO: 1.53 10*3/MM3 (ref 1–3.5)
LYMPHOCYTES NFR BLD AUTO: 18.8 % (ref 20–49)
MCH RBC QN AUTO: 22.9 PG (ref 27–33)
MCHC RBC AUTO-ENTMCNC: 29.3 G/DL (ref 32–35)
MCV RBC AUTO: 78.2 FL (ref 83–97)
MONOCYTES # BLD AUTO: 0.56 10*3/MM3 (ref 0.25–0.8)
MONOCYTES NFR BLD AUTO: 6.9 % (ref 4–12)
NEUTROPHILS # BLD AUTO: 5.92 10*3/MM3 (ref 1.5–7)
NEUTROPHILS NFR BLD AUTO: 72.9 % (ref 39–75)
NRBC BLD MANUAL-RTO: 0 /100 WBC (ref 0–0)
PLATELET # BLD AUTO: 252 10*3/MM3 (ref 150–375)
RBC # BLD AUTO: 3.71 10*6/MM3 (ref 3.9–5)
WBC NRBC COR # BLD: 8.13 10*3/MM3 (ref 4–10)

## 2018-01-18 PROCEDURE — 85025 COMPLETE CBC W/AUTO DIFF WBC: CPT | Performed by: INTERNAL MEDICINE

## 2018-01-18 PROCEDURE — 36416 COLLJ CAPILLARY BLOOD SPEC: CPT | Performed by: INTERNAL MEDICINE

## 2018-01-18 PROCEDURE — 63710000001 DIPHENHYDRAMINE PER 50 MG: Performed by: INTERNAL MEDICINE

## 2018-01-18 PROCEDURE — 25010000002 IRON SUCROSE PER 1 MG: Performed by: INTERNAL MEDICINE

## 2018-01-18 PROCEDURE — 96375 TX/PRO/DX INJ NEW DRUG ADDON: CPT | Performed by: INTERNAL MEDICINE

## 2018-01-18 PROCEDURE — 96365 THER/PROPH/DIAG IV INF INIT: CPT | Performed by: INTERNAL MEDICINE

## 2018-01-18 RX ORDER — FAMOTIDINE 10 MG/ML
20 INJECTION, SOLUTION INTRAVENOUS ONCE
Status: COMPLETED | OUTPATIENT
Start: 2018-01-18 | End: 2018-01-18

## 2018-01-18 RX ORDER — ACETAMINOPHEN 325 MG/1
650 TABLET ORAL ONCE
Status: COMPLETED | OUTPATIENT
Start: 2018-01-18 | End: 2018-01-18

## 2018-01-18 RX ORDER — SODIUM CHLORIDE 9 MG/ML
250 INJECTION, SOLUTION INTRAVENOUS ONCE
Status: COMPLETED | OUTPATIENT
Start: 2018-01-18 | End: 2018-01-18

## 2018-01-18 RX ORDER — DIPHENHYDRAMINE HCL 25 MG
25 CAPSULE ORAL ONCE
Status: COMPLETED | OUTPATIENT
Start: 2018-01-18 | End: 2018-01-18

## 2018-01-18 RX ADMIN — SODIUM CHLORIDE 250 ML: 900 INJECTION, SOLUTION INTRAVENOUS at 14:33

## 2018-01-18 RX ADMIN — DIPHENHYDRAMINE HYDROCHLORIDE 25 MG: 25 CAPSULE ORAL at 14:35

## 2018-01-18 RX ADMIN — ACETAMINOPHEN 650 MG: 325 TABLET ORAL at 14:35

## 2018-01-18 RX ADMIN — FAMOTIDINE 20 MG: 10 INJECTION, SOLUTION INTRAVENOUS at 14:36

## 2018-01-18 RX ADMIN — IRON SUCROSE 200 MG: 20 INJECTION, SOLUTION INTRAVENOUS at 14:57

## 2018-02-06 ENCOUNTER — ROUTINE PRENATAL (OUTPATIENT)
Dept: OBSTETRICS AND GYNECOLOGY | Facility: CLINIC | Age: 33
End: 2018-02-06

## 2018-02-06 VITALS — DIASTOLIC BLOOD PRESSURE: 69 MMHG | SYSTOLIC BLOOD PRESSURE: 107 MMHG | BODY MASS INDEX: 33.07 KG/M2 | WEIGHT: 175 LBS

## 2018-02-06 DIAGNOSIS — Z13.1 SCREENING FOR DIABETES MELLITUS: ICD-10-CM

## 2018-02-06 DIAGNOSIS — Z34.80 NORMAL PREGNANCY IN MULTIGRAVIDA: ICD-10-CM

## 2018-02-06 DIAGNOSIS — O35.BXX0 ECHOGENIC FOCUS OF HEART OF FETUS AFFECTING ANTEPARTUM CARE OF MOTHER, SINGLE OR UNSPECIFIED FETUS: ICD-10-CM

## 2018-02-06 DIAGNOSIS — D50.0 IRON DEFICIENCY ANEMIA DUE TO CHRONIC BLOOD LOSS: Primary | ICD-10-CM

## 2018-02-06 PROCEDURE — 99213 OFFICE O/P EST LOW 20 MIN: CPT | Performed by: OBSTETRICS & GYNECOLOGY

## 2018-02-06 NOTE — PROGRESS NOTES
Chief complaint: Pregnancy  History present illness: Patient is here for her routine prenatal visit.  She has no major complaints today.  She has started iron infusions with hematology.  She reports that she has felt much better since starting this.  Hemoglobin has increased from about 7.3 to about 8.5 since starting the infusions.  She has missed a few visits because of her work schedule, but reports that she is more able to go to these appointments now.  She also had seen maternal fetal medicine previously.  She has another appointment with them next week.  She reports good fetal movement.  Objective: See vital signs in flowsheet  Gen.: No acute distress, awake and oriented ×3  Abdomen: Soft, nontender, fetal heart tones 140  Extremities: No edema, no tenderness  Assessment:  1.  32-year-old  9 para 4 at 23-0/7 weeks gestational age  2.  Severe anemia of pregnancy, improved on iron infusions  3.  Echogenic cardiac focus, cell free DNA testing normal  4.  Request sterilization  Plan:  1.  Patient's encouraged to remain compliant with her iron transfusions in order to decrease the need for possible blood transfusion later pregnancy.  She verbalizes understanding.  I would like for her to have another ultrasound for growth in about 4 weeks.  This may either be done at maternal-fetal medicine or in our office.  2.  Cell free DNA testing results discussed with the patient's.  3.  Return to the office in 4 weeks.  Continue to follow up with maternal-fetal medicine and hematology as scheduled.  I spent 12 out of 15 minutes with the patient in face to face counseling of the above issues.

## 2018-02-08 ENCOUNTER — OFFICE VISIT (OUTPATIENT)
Dept: ONCOLOGY | Facility: CLINIC | Age: 33
End: 2018-02-08

## 2018-02-08 ENCOUNTER — LAB (OUTPATIENT)
Dept: LAB | Facility: HOSPITAL | Age: 33
End: 2018-02-08

## 2018-02-08 VITALS
HEIGHT: 63 IN | RESPIRATION RATE: 16 BRPM | WEIGHT: 174.3 LBS | DIASTOLIC BLOOD PRESSURE: 70 MMHG | HEART RATE: 93 BPM | BODY MASS INDEX: 30.88 KG/M2 | TEMPERATURE: 98.3 F | SYSTOLIC BLOOD PRESSURE: 104 MMHG | OXYGEN SATURATION: 100 %

## 2018-02-08 DIAGNOSIS — D50.0 IRON DEFICIENCY ANEMIA DUE TO CHRONIC BLOOD LOSS: ICD-10-CM

## 2018-02-08 DIAGNOSIS — O99.012 ANEMIA COMPLICATING PREGNANCY IN SECOND TRIMESTER: ICD-10-CM

## 2018-02-08 DIAGNOSIS — D75.839 THROMBOCYTOSIS: ICD-10-CM

## 2018-02-08 DIAGNOSIS — D50.0 IRON DEFICIENCY ANEMIA DUE TO CHRONIC BLOOD LOSS: Primary | ICD-10-CM

## 2018-02-08 LAB
BASOPHILS # BLD AUTO: 0.03 10*3/MM3 (ref 0–0.1)
BASOPHILS NFR BLD AUTO: 0.6 % (ref 0–1.1)
DEPRECATED RDW RBC AUTO: 61.1 FL (ref 37–49)
EOSINOPHIL # BLD AUTO: 0.07 10*3/MM3 (ref 0–0.36)
EOSINOPHIL NFR BLD AUTO: 1.3 % (ref 1–5)
ERYTHROCYTE [DISTWIDTH] IN BLOOD BY AUTOMATED COUNT: 22.1 % (ref 11.7–14.5)
HCT VFR BLD AUTO: 28 % (ref 34–45)
HGB BLD-MCNC: 8.4 G/DL (ref 11.5–14.9)
IMM GRANULOCYTES # BLD: 0.04 10*3/MM3 (ref 0–0.03)
IMM GRANULOCYTES NFR BLD: 0.8 % (ref 0–0.5)
LYMPHOCYTES # BLD AUTO: 1.13 10*3/MM3 (ref 1–3.5)
LYMPHOCYTES NFR BLD AUTO: 21.5 % (ref 20–49)
MCH RBC QN AUTO: 23.5 PG (ref 27–33)
MCHC RBC AUTO-ENTMCNC: 30 G/DL (ref 32–35)
MCV RBC AUTO: 78.4 FL (ref 83–97)
MONOCYTES # BLD AUTO: 0.43 10*3/MM3 (ref 0.25–0.8)
MONOCYTES NFR BLD AUTO: 8.2 % (ref 4–12)
NEUTROPHILS # BLD AUTO: 3.55 10*3/MM3 (ref 1.5–7)
NEUTROPHILS NFR BLD AUTO: 67.6 % (ref 39–75)
NRBC BLD MANUAL-RTO: 0 /100 WBC (ref 0–0)
PLATELET # BLD AUTO: 236 10*3/MM3 (ref 150–375)
RBC # BLD AUTO: 3.57 10*6/MM3 (ref 3.9–5)
WBC NRBC COR # BLD: 5.25 10*3/MM3 (ref 4–10)

## 2018-02-08 PROCEDURE — 99213 OFFICE O/P EST LOW 20 MIN: CPT | Performed by: INTERNAL MEDICINE

## 2018-02-08 PROCEDURE — 85025 COMPLETE CBC W/AUTO DIFF WBC: CPT | Performed by: INTERNAL MEDICINE

## 2018-02-08 PROCEDURE — 36415 COLL VENOUS BLD VENIPUNCTURE: CPT | Performed by: INTERNAL MEDICINE

## 2018-02-08 NOTE — PROGRESS NOTES
[facility] GROUP OUTPATIENT FOLLOW UP CLINIC VISIT    REASON FOR FOLLOW-UP:    1.  Iron deficiency anemia  2.  Intrauterine pregnancy  3.  Thrombocytosis    HISTORY OF PRESENT ILLNESS:  Stewart Emery is a 32 y.o. female who returns today for follow up of the above issue.  She continues oral iron but also initiated intravenous Venofer a few weeks ago.  She has received 2 doses of 200 mg.  She has tolerated this very well.  She is feeling much better with significantly increased energy and resolution of her shortness of breath.        HEMATOLOGIC HISTORY:  She presented initially on 12/12/2017 at approximately 15 weeks gestation.    Labs on 10/31/2017 showed hemoglobin of 8.6 with hematocrit 27.6%.  Platelets were high at 533.  The MCV was low at 71.    Labs at initial presentation on 12/12/2017 showed hemoglobin of 7.7 with hematocrit 24.1%.     She has a long history of severe microcytic anemia.  She has required red blood cell transfusions in the past.  She had a normal hemoglobin electrophoresis in the past.     When she does menstruate she has heavy menstrual periods.  Again, she is currently approximately 15 weeks pregnant.  She denies any bleeding at this time.  She is on a very expensive iron supplement which she has run out of and she was unable to get it due to the expense.    We initially recommended ferrous sulfate 325 mg 3 times daily.  However her hemoglobin did not respond to this and in fact dropped to 7.2 as of 1/1/2018 and therefore on 1/11/2018 we initiated therapy with Venofer 200 mg with plans for weekly treatment ×5 weeks.    As of 2/8/2018 she has received 2 doses of Venofer 200 mg with improvement in her hemoglobin to 8.4.  3 more doses are planned.    PAST MEDICAL, SURGICAL, FAMILY, AND SOCIAL HISTORIES were reviewed with the patient and in the electronic medical record, and were updated if indicated.    ALLERGIES:  Allergies   Allergen Reactions   • Darvon  [Propoxyphene] Rash   •  "Penicillins Rash       MEDICATIONS:  The medication list has been reviewed with the patient by the medical assistant, and the list has been updated in the electronic medical record, which I reviewed.  Medication dosages and frequencies were confirmed to be accurate.    REVIEW OF SYSTEMS:  PAIN:  See Vital Signs below.  GENERAL:  No fevers, chills, night sweats, or unintended weight loss.  SKIN:  No rashes or non-healing lesions.  HEME/LYMPH:  No abnormal bleeding.  No palpable lymphadenopathy.  EYES:  No vision changes or diplopia.  ENT:  No sore throat or difficulty swallowing.  RESPIRATORY:  No cough, shortness of breath, hemoptysis, or wheezing.  CARDIOVASCULAR:  No chest pain, palpitations, orthopnea, or dyspnea on exertion.  GASTROINTESTINAL:  No abdominal pain, nausea, vomiting, constipation, diarrhea, melena, or hematochezia.  GENITOURINARY:  No dysuria or hematuria.  MUSCULOSKELETAL:  No joint pain, swelling, or erythema.  NEUROLOGIC:  No dizziness, loss of consciousness, or seizures.  PSYCHIATRIC:  No depression, anxiety, or mood changes.    Vitals:    02/08/18 0846   BP: 104/70   Pulse: 93   Resp: 16   Temp: 98.3 °F (36.8 °C)   TempSrc: Oral   SpO2: 100%   Weight: 79.1 kg (174 lb 4.8 oz)   Height: 159 cm (62.6\")   PainSc: 0-No pain       PHYSICAL EXAMINATION:  GENERAL:  Well-developed well-nourished female; awake, alert and oriented, in no acute distress.  SKIN:  Warm and dry, without rashes, purpura, or petechiae.  HEAD:  Normocephalic, atraumatic.  EYES:  Pupils equal, round and reactive to light.  Extraocular movements intact.  Conjunctivae normal.  EARS:  Hearing intact.  NOSE:  Septum midline.  No excoriations or nasal discharge.  MOUTH:  No stomatitis or ulcers.  Lips are normal.  THROAT:  Oropharynx without lesions or exudates.  NECK:  Supple with good range of motion; no thyromegaly or masses; no JVD or bruits.  LYMPHATICS:  No cervical, supraclavicular, axillary, or inguinal " lymphadenopathy.  CHEST:  Lungs are clear to auscultation bilaterally.  No wheezes, rales, or rhonchi.  HEART:  Regular rate; normal rhythm.  No murmurs, gallops or rubs.  ABDOMEN:  Soft, non-tender, non-distended.  Normal active bowel sounds.  No organomegaly.  EXTREMITIES:  No clubbing, cyanosis, or edema.  NEUROLOGICAL:  No focal neurologic deficits.    DIAGNOSTIC DATA:  Lab Results   Component Value Date    WBC 5.25 02/08/2018    HGB 8.4 (L) 02/08/2018    HCT 28.0 (L) 02/08/2018    MCV 78.4 (L) 02/08/2018     02/08/2018       IMAGING:  None reviewed    ASSESSMENT:  This is a 32 y.o. female with:  1.  Thrombocytosis: This has resolved.  It was likely secondary to iron deficiency.  2.  Iron deficiency anemia associated with pregnancy: She has a long history of this apparently.  Numbers are improving after 2 doses of intravenous Venofer 200 mg.  We plan for 3 more doses with the next dose being tomorrow.  I will see her back about one month after that with repeat labs including ferritin and iron studies.  She can continue oral iron as well at this point.  She is tolerating it well.    PLAN:  1.  3 more doses of Venofer 200 mg intravenously with the next scheduled for tomorrow.  She continues oral iron as well which she tolerates well.  2.  I will see her back about one month after she completes her Venofer with labs including a CBC, reticulocyte count, ferritin, and iron panel.

## 2018-02-09 ENCOUNTER — APPOINTMENT (OUTPATIENT)
Dept: ONCOLOGY | Facility: HOSPITAL | Age: 33
End: 2018-02-09

## 2018-02-12 ENCOUNTER — APPOINTMENT (OUTPATIENT)
Dept: ONCOLOGY | Facility: HOSPITAL | Age: 33
End: 2018-02-12

## 2018-02-16 ENCOUNTER — APPOINTMENT (OUTPATIENT)
Dept: ONCOLOGY | Facility: HOSPITAL | Age: 33
End: 2018-02-16

## 2018-03-06 ENCOUNTER — POSTPARTUM VISIT (OUTPATIENT)
Dept: OBSTETRICS AND GYNECOLOGY | Facility: CLINIC | Age: 33
End: 2018-03-06

## 2018-03-06 ENCOUNTER — RESULTS ENCOUNTER (OUTPATIENT)
Dept: OBSTETRICS AND GYNECOLOGY | Facility: CLINIC | Age: 33
End: 2018-03-06

## 2018-03-06 VITALS
HEART RATE: 73 BPM | HEIGHT: 62 IN | SYSTOLIC BLOOD PRESSURE: 102 MMHG | BODY MASS INDEX: 29.81 KG/M2 | WEIGHT: 162 LBS | DIASTOLIC BLOOD PRESSURE: 67 MMHG

## 2018-03-06 DIAGNOSIS — Z30.016 ENCOUNTER FOR INITIAL PRESCRIPTION OF TRANSDERMAL PATCH HORMONAL CONTRACEPTIVE DEVICE: ICD-10-CM

## 2018-03-06 DIAGNOSIS — Z30.2 REQUEST FOR STERILIZATION: ICD-10-CM

## 2018-03-06 DIAGNOSIS — Z13.1 SCREENING FOR DIABETES MELLITUS: ICD-10-CM

## 2018-03-06 PROBLEM — Z64.1 GRAND MULTIPARA: Status: RESOLVED | Noted: 2017-10-31 | Resolved: 2018-03-06

## 2018-03-06 PROBLEM — O35.BXX0 ECHOGENIC FOCUS OF HEART OF FETUS AFFECTING ANTEPARTUM CARE OF MOTHER: Status: RESOLVED | Noted: 2018-01-03 | Resolved: 2018-03-06

## 2018-03-06 PROBLEM — Z36.0 ENCOUNTER FOR ANTENATAL SCREENING FOR CHROMOSOMAL ANOMALIES: Status: RESOLVED | Noted: 2018-01-03 | Resolved: 2018-03-06

## 2018-03-06 PROBLEM — Z34.80 NORMAL PREGNANCY IN MULTIGRAVIDA: Status: RESOLVED | Noted: 2017-10-31 | Resolved: 2018-03-06

## 2018-03-06 PROBLEM — O21.9 NAUSEA/VOMITING IN PREGNANCY: Status: RESOLVED | Noted: 2017-10-31 | Resolved: 2018-03-06

## 2018-03-06 PROCEDURE — 99213 OFFICE O/P EST LOW 20 MIN: CPT | Performed by: OBSTETRICS & GYNECOLOGY

## 2018-03-06 NOTE — PROGRESS NOTES
Subjective   Stewart Emery is a 32 y.o. female   CC: Pt here for pp.  History of Present Illness  Pt here for pp. she is 3 weeks status post a  vaginal delivery.  She delivered actually at home at about 24 weeks' gestation.  At that time, she called an ambulance who took she and the baby to [facility].  Baby is currently in the NICU.  Baby has several complications related to prematurity, but so far is progressing.  The patient is without complaints today.  She denies signs or symptoms of postpartum depression.  She denies any bleeding.  She is pumping breast milk.  The patient that her pregnancy expressed the desire for permanent sterilization.  She still wants to have her tubes tied at this time.  She reports that she would like to wait until around the time that the baby is going to be discharged home the hospital.  She understands that this may be as much as 3-4 months, or even longer.  In the interim, she would like to have other forms contraception.  She does not think that she'll limited take pills daily.  She does not want to have Depo-Provera shot because of the breakthrough bleeding.  She is interested in birth control patches.    The following portions of the patient's history were reviewed and updated as appropriate: allergies, current medications, past family history, past medical history, past social history, past surgical history and problem list.    Review of Systems  General: No fever or chills  Constitutional: No weight loss or gain, no hair loss  HENT: No headache, no hearing loss, no tinnitus  Eyes: normal vision, no eye pain  Lungs: No cough, no shortness of breath  Heart: No chest pain, no palpitations  Abdomen: No nausea, vomiting, constipation or diarrhea  : No dysuria, no hematuria  Skin: No rashes  Lymph: No swelling  Neuro: No parathesia, no weakness  Psych: Normal though content, no hallucinations, no SI/HI    Objective   Physical Exam  Vitals:    18 1105  "  BP: 102/67   Pulse: 73   Weight: 73.5 kg (162 lb)   Height: 157.5 cm (62\")   Gen: No acute distress, awake and oriented times three  Abdomen: soft, nontender, non distended, normoactive bowel sounds  Pelvic: Exam performed in the presence of a female chaperone  Patient has provided verbal consent to proceed with exam.  Normal external female genitalia, no lesions  Vagina: No blood or discharge  Cervix: No cervical motion tenderness, no lesions, no active bleeding, nonfriable  Uterus: retroverted, normal size and shape, nontender  Adnexa: No masses or tenderness  Rectal: Deferred  Psych: Good judgement and insight, normal affect and mood      Assessment/Plan   Diagnoses and all orders for this visit:    Postpartum exam     delivery, delivered    Anemia of mother during pregnancy, delivered    Request for sterilization    Encounter for initial prescription of transdermal patch hormonal contraceptive device  -     norelgestromin-ethinyl estradiol (ORTHO EVRA) 150-35 MCG/24HR; Place 1 patch on the skin 1 (One) Time Per Week.    Patient has recurrent well from a vaginal delivery.  Physical exam is unremarkable today.  The patient would like to return to work around 18.  She expects that things will be more stable with the baby at that time.  Given her very complicated and stressful  delivery, I believe this is reasonable to wait until her time to return to work.  We discussed the patient's plans for contraception and sterilization.  She still strongly desires sterilization.  I asked the patient that even in the worse case scenario if her baby does not biophysical which she still want to have sterilization, and she remains adamant that she does not want have any future children.  She does want to wait until around the time that the baby is expected be discharged home to have her sterilization procedure performed.  We discussed the risks, benefits, and alternatives to surgery and the typical recovery " process involved.  I will have her sign her sterilization consents today, and returned about 3 months for planning of her sterilization procedure.  If she wishes to return sooner that to schedule that she may do that as well.  In the interim, she would like to have birth control patches.  Suction screens were discussed.  Side effects were discussed.  All her questions are answered.  Patient does have a history of significant anemia in the past.  She was undergoing iron transfusion therapies during the pregnancy.  She is advised to follow-up with hematology as soon as possible for follow-up of this.  She verbalizes a plan to call the office to make an appointment to see hematology.    I spent 10 out of 15 minutes with the patient in face to face counseling of the above issues.

## 2018-03-19 ENCOUNTER — APPOINTMENT (OUTPATIENT)
Dept: LAB | Facility: HOSPITAL | Age: 33
End: 2018-03-19

## 2018-03-19 ENCOUNTER — APPOINTMENT (OUTPATIENT)
Dept: ONCOLOGY | Facility: CLINIC | Age: 33
End: 2018-03-19

## 2018-03-26 ENCOUNTER — OFFICE VISIT (OUTPATIENT)
Dept: ONCOLOGY | Facility: CLINIC | Age: 33
End: 2018-03-26

## 2018-03-26 ENCOUNTER — LAB (OUTPATIENT)
Dept: LAB | Facility: HOSPITAL | Age: 33
End: 2018-03-26

## 2018-03-26 VITALS
HEIGHT: 62 IN | WEIGHT: 165 LBS | TEMPERATURE: 98.3 F | SYSTOLIC BLOOD PRESSURE: 104 MMHG | HEART RATE: 64 BPM | RESPIRATION RATE: 16 BRPM | OXYGEN SATURATION: 100 % | DIASTOLIC BLOOD PRESSURE: 62 MMHG | BODY MASS INDEX: 30.36 KG/M2

## 2018-03-26 DIAGNOSIS — O99.012 ANEMIA COMPLICATING PREGNANCY IN SECOND TRIMESTER: ICD-10-CM

## 2018-03-26 DIAGNOSIS — D50.0 IRON DEFICIENCY ANEMIA DUE TO CHRONIC BLOOD LOSS: Primary | ICD-10-CM

## 2018-03-26 DIAGNOSIS — D50.0 IRON DEFICIENCY ANEMIA DUE TO CHRONIC BLOOD LOSS: ICD-10-CM

## 2018-03-26 DIAGNOSIS — D75.839 THROMBOCYTOSIS: ICD-10-CM

## 2018-03-26 LAB
BASOPHILS # BLD AUTO: 0.03 10*3/MM3 (ref 0–0.1)
BASOPHILS NFR BLD AUTO: 0.7 % (ref 0–1.1)
DEPRECATED RDW RBC AUTO: 64.9 FL (ref 37–49)
EOSINOPHIL # BLD AUTO: 0.09 10*3/MM3 (ref 0–0.36)
EOSINOPHIL NFR BLD AUTO: 2 % (ref 1–5)
ERYTHROCYTE [DISTWIDTH] IN BLOOD BY AUTOMATED COUNT: 22.1 % (ref 11.7–14.5)
FERRITIN SERPL-MCNC: 8.5 NG/ML (ref 11–207)
HCT VFR BLD AUTO: 35.5 % (ref 34–45)
HGB BLD-MCNC: 10.4 G/DL (ref 11.5–14.9)
HGB RETIC QN: 28 PG (ref 29.8–36.1)
IMM GRANULOCYTES # BLD: 0.01 10*3/MM3 (ref 0–0.03)
IMM GRANULOCYTES NFR BLD: 0.2 % (ref 0–0.5)
IMM RETICS NFR: 16.9 % (ref 3–15.8)
IRON 24H UR-MRATE: 87 MCG/DL (ref 37–145)
IRON SATN MFR SERPL: 21 % (ref 14–48)
LYMPHOCYTES # BLD AUTO: 1.67 10*3/MM3 (ref 1–3.5)
LYMPHOCYTES NFR BLD AUTO: 37.9 % (ref 20–49)
MCH RBC QN AUTO: 24.6 PG (ref 27–33)
MCHC RBC AUTO-ENTMCNC: 29.3 G/DL (ref 32–35)
MCV RBC AUTO: 84.1 FL (ref 83–97)
MONOCYTES # BLD AUTO: 0.25 10*3/MM3 (ref 0.25–0.8)
MONOCYTES NFR BLD AUTO: 5.7 % (ref 4–12)
NEUTROPHILS # BLD AUTO: 2.36 10*3/MM3 (ref 1.5–7)
NEUTROPHILS NFR BLD AUTO: 53.5 % (ref 39–75)
NRBC BLD MANUAL-RTO: 0 /100 WBC (ref 0–0)
PLATELET # BLD AUTO: 268 10*3/MM3 (ref 150–375)
RBC # BLD AUTO: 4.22 10*6/MM3 (ref 3.9–5)
RETICS/RBC NFR AUTO: 1.38 % (ref 0.6–2)
TIBC SERPL-MCNC: 421 MCG/DL (ref 249–505)
TRANSFERRIN SERPL-MCNC: 301 MG/DL (ref 200–360)
WBC NRBC COR # BLD: 4.41 10*3/MM3 (ref 4–10)

## 2018-03-26 PROCEDURE — 85025 COMPLETE CBC W/AUTO DIFF WBC: CPT | Performed by: INTERNAL MEDICINE

## 2018-03-26 PROCEDURE — 83540 ASSAY OF IRON: CPT | Performed by: INTERNAL MEDICINE

## 2018-03-26 PROCEDURE — 84466 ASSAY OF TRANSFERRIN: CPT | Performed by: INTERNAL MEDICINE

## 2018-03-26 PROCEDURE — 85046 RETICYTE/HGB CONCENTRATE: CPT | Performed by: INTERNAL MEDICINE

## 2018-03-26 PROCEDURE — 82728 ASSAY OF FERRITIN: CPT | Performed by: INTERNAL MEDICINE

## 2018-03-26 PROCEDURE — 99213 OFFICE O/P EST LOW 20 MIN: CPT | Performed by: INTERNAL MEDICINE

## 2018-03-26 PROCEDURE — 36415 COLL VENOUS BLD VENIPUNCTURE: CPT | Performed by: INTERNAL MEDICINE

## 2018-03-26 NOTE — PROGRESS NOTES
[facility] GROUP OUTPATIENT FOLLOW UP CLINIC VISIT    REASON FOR FOLLOW-UP:    1.  Iron deficiency anemia during pregnancy  2.  Thrombocytosis    HISTORY OF PRESENT ILLNESS:  Stewart Emery is a 32 y.o. female who returns today for follow up of the above issue.  She received 2 doses of IV Venofer at 200 mg while pregnant.  She tolerated this very well.  She was feeling much better with significantly increased energy and resolution of her shortness of breath.  We plan for 3 more doses of intravenous Venofer but she missed appointments and then actually at 24 weeks gestation delivered spontaneously at home on 2/14/2018.  She is not on any oral iron at this point.  She denies much bleeding.        HEMATOLOGIC HISTORY:  She presented initially on 12/12/2017 at approximately 15 weeks gestation.    Labs on 10/31/2017 showed hemoglobin of 8.6 with hematocrit 27.6%.  Platelets were high at 533.  The MCV was low at 71.    Labs at initial presentation on 12/12/2017 showed hemoglobin of 7.7 with hematocrit 24.1%.     She has a long history of severe microcytic anemia.  She has required red blood cell transfusions in the past.  She had a normal hemoglobin electrophoresis in the past.     When she does menstruate she has heavy menstrual periods.  Again, she is currently approximately 15 weeks pregnant.  She denies any bleeding at this time.  She is on a very expensive iron supplement which she has run out of and she was unable to get it due to the expense.    We initially recommended ferrous sulfate 325 mg 3 times daily.  However her hemoglobin did not respond to this and in fact dropped to 7.2 as of 1/1/2018 and therefore on 1/11/2018 we initiated therapy with Venofer 200 mg with plans for weekly treatment ×5 weeks.    As of 2/8/2018 she has received 2 doses of Venofer 200 mg with improvement in her hemoglobin to 8.4.  3 more doses were planned but not received.  She actually delivered at home at 24 weeks gestation on  "2/14/2018.    PAST MEDICAL, SURGICAL, FAMILY, AND SOCIAL HISTORIES were reviewed with the patient and in the electronic medical record, and were updated if indicated.    ALLERGIES:  Allergies   Allergen Reactions   • Darvon  [Propoxyphene] Rash   • Penicillins Rash       MEDICATIONS:  The medication list has been reviewed with the patient by the medical assistant, and the list has been updated in the electronic medical record, which I reviewed.  Medication dosages and frequencies were confirmed to be accurate.    REVIEW OF SYSTEMS:  PAIN:  See Vital Signs below.  GENERAL:  No fevers, chills, night sweats, or unintended weight loss.  SKIN:  No rashes or non-healing lesions.  HEME/LYMPH:  No abnormal bleeding.  No palpable lymphadenopathy.  EYES:  No vision changes or diplopia.  ENT:  No sore throat or difficulty swallowing.  RESPIRATORY:  No cough, shortness of breath, hemoptysis, or wheezing.  CARDIOVASCULAR:  No chest pain, palpitations, orthopnea, or dyspnea on exertion.  GASTROINTESTINAL:  No abdominal pain, nausea, vomiting, constipation, diarrhea, melena, or hematochezia.  GENITOURINARY:  No dysuria or hematuria.  MUSCULOSKELETAL:  No joint pain, swelling, or erythema.  NEUROLOGIC:  No dizziness, loss of consciousness, or seizures.  PSYCHIATRIC:  No depression, anxiety, or mood changes.    Vitals:    03/26/18 1631   BP: 104/62   Pulse: 64   Resp: 16   Temp: 98.3 °F (36.8 °C)   TempSrc: Oral   SpO2: 100%   Weight: 74.8 kg (165 lb)   Height: 157.5 cm (62.01\")   PainSc: 0-No pain       PHYSICAL EXAMINATION:  GENERAL:  Well-developed well-nourished female; awake, alert and oriented, in no acute distress.  SKIN:  Warm and dry, without rashes, purpura, or petechiae.  HEAD:  Normocephalic, atraumatic.  EYES:  Pupils equal, round and reactive to light.  Extraocular movements intact.  Conjunctivae normal.  EARS:  Hearing intact.  NOSE:  Septum midline.  No excoriations or nasal discharge.  MOUTH:  No stomatitis or " ulcers.  Lips are normal.  LYMPHATICS:  No cervical, supraclavicular, axillary lymphadenopathy.  CHEST:  Lungs are clear to auscultation bilaterally.  No wheezes, rales, or rhonchi.  HEART:  Regular rate; normal rhythm.  No murmurs, gallops or rubs.  EXTREMITIES:  No clubbing, cyanosis, or edema.  NEUROLOGICAL:  No focal neurologic deficits.    DIAGNOSTIC DATA:  Lab Results   Component Value Date    WBC 4.41 03/26/2018    HGB 10.4 (L) 03/26/2018    HCT 35.5 03/26/2018    MCV 84.1 03/26/2018     03/26/2018       IMAGING:  None reviewed    ASSESSMENT:  This is a 32 y.o. female with:  1.  Thrombocytosis: This has resolved.  It was likely secondary to iron deficiency.  2.  Iron deficiency anemia associated with pregnancy: She has a long history of Iron deficiency anemia.  She received 2 doses of intravenous Venofer at 200 mg all she was pregnant.  She then delivered at home at 24 weeks gestation.  Her hemoglobin has improved at 10.4.  Her MCV is normal.  Iron studies are currently pending.  She is no longer on an oral iron supplement but I encouraged her to resume this.      PLAN:  1.  She will resume her oral iron supplement ideally twice daily  2.  I will see her back in about 2 months for follow-up with labs.

## 2018-08-01 ENCOUNTER — TELEPHONE (OUTPATIENT)
Dept: OBSTETRICS AND GYNECOLOGY | Facility: CLINIC | Age: 33
End: 2018-08-01

## 2018-08-01 NOTE — TELEPHONE ENCOUNTER
Have the patient come into the office for preoperative appointment for her tubal ligation.  She should be scheduled as soon as possible, because the tubal consents are only valid for 6 months, and her consents were signed on 3/6/18.  There is a possibility that she may need to sign a new consent based on when we are able to get her tubal scheduled.

## 2018-08-01 NOTE — TELEPHONE ENCOUNTER
Pt called stating she has already signed papers for her tubal sx and would like to schedule it. Thanks    [phone]

## 2020-07-19 NOTE — TELEPHONE ENCOUNTER
Could not leave a message due to her mail box was full. TAMEKA   no abrasions, no jaundice, no lesions, no pruritis, and no rashes.